# Patient Record
Sex: MALE | Race: OTHER | HISPANIC OR LATINO | URBAN - METROPOLITAN AREA
[De-identification: names, ages, dates, MRNs, and addresses within clinical notes are randomized per-mention and may not be internally consistent; named-entity substitution may affect disease eponyms.]

---

## 2020-07-01 ENCOUNTER — LAB OUTSIDE AN ENCOUNTER (OUTPATIENT)
Dept: URBAN - METROPOLITAN AREA CLINIC 121 | Facility: CLINIC | Age: 40
End: 2020-07-01

## 2020-07-08 LAB
ABSOLUTE BASOPHILS: (no result)
ABSOLUTE EOSINOPHILS: (no result)
ABSOLUTE LYMPHOCYTES: (no result)
ABSOLUTE MONOCYTES: (no result)
ABSOLUTE NEUTROPHILS: (no result)
ALBUMIN/GLOBULIN RATIO: (no result)
ALBUMIN: (no result)
ALKALINE PHOSPHATASE: (no result)
ALT: (no result)
AST: (no result)
BASOPHILS: (no result)
BILIRUBIN, TOTAL: (no result)
BUN/CREATININE RATIO: (no result)
C-REACTIVE PROTEIN: (no result)
CALCIUM: (no result)
CARBON DIOXIDE: (no result)
CHLORIDE: (no result)
CREATININE: (no result)
EGFR AFRICAN AMERICA: (no result)
EGFR NON-AFR. AMERICAN: (no result)
EOSINOPHILS: (no result)
GLOBULIN: (no result)
GLUCOSE: (no result)
HEMATOCRIT: (no result)
HEMOGLOBIN: (no result)
HEPATITIS B CORE ANTIBODY (IGM): (no result)
HEPATITIS B SURFACE ANTIGEN: (no result)
LYMPHOCYTES: (no result)
MCH: (no result)
MCHC: (no result)
MCV: (no result)
MITOGEN-NIL: (no result)
MONOCYTES: (no result)
MPV: (no result)
NEUTROPHILS: (no result)
NIL: (no result)
PLATELET COUNT: (no result)
POTASSIUM: (no result)
PROTEIN, TOTAL: (no result)
QUANTIFERON(R)-TB GOLD PLUS, 1 TUBE: NEGATIVE
RDW: (no result)
RED BLOOD CELL COUNT: (no result)
SODIUM: (no result)
TB1-NIL: (no result)
TB2-NIL: (no result)
UREA NITROGEN (BUN): (no result)
WHITE BLOOD CELL COUNT: (no result)

## 2020-11-11 ENCOUNTER — OFFICE VISIT (OUTPATIENT)
Dept: URBAN - METROPOLITAN AREA CLINIC 63 | Facility: CLINIC | Age: 40
End: 2020-11-11

## 2020-12-01 ENCOUNTER — OFFICE VISIT (OUTPATIENT)
Dept: URBAN - METROPOLITAN AREA CLINIC 121 | Facility: CLINIC | Age: 40
End: 2020-12-01

## 2020-12-10 LAB
ABSOLUTE BASOPHILS: (no result)
ABSOLUTE EOSINOPHILS: (no result)
ABSOLUTE LYMPHOCYTES: (no result)
ABSOLUTE MONOCYTES: (no result)
ABSOLUTE NEUTROPHILS: (no result)
ALBUMIN/GLOBULIN RATIO: (no result)
ALBUMIN: (no result)
ALKALINE PHOSPHATASE: (no result)
ALT: (no result)
AST: (no result)
BASOPHILS: (no result)
BILIRUBIN, TOTAL: (no result)
BUN/CREATININE RATIO: (no result)
CALCIUM: (no result)
CARBON DIOXIDE: (no result)
CHLORIDE: (no result)
COMMENT: (no result)
CREATININE: (no result)
EGFR AFRICAN AMERICA: (no result)
EGFR NON-AFR. AMERICAN: (no result)
EOSINOPHILS: (no result)
GLOBULIN: (no result)
GLUCOSE: (no result)
HEMATOCRIT: (no result)
HEMOGLOBIN: (no result)
INFLIXIMAB AB, IBD: (no result)
INFLIXIMAB LEVEL, IBD: (no result)
INTERPRETATION: (no result)
LYMPHOCYTES: (no result)
MCH: (no result)
MCHC: (no result)
MCV: (no result)
MONOCYTES: (no result)
MPV: (no result)
NEUTROPHILS: (no result)
PLATELET COUNT: (no result)
POTASSIUM: (no result)
PROTEIN, TOTAL: (no result)
RDW: (no result)
RED BLOOD CELL COUNT: (no result)
SODIUM: (no result)
UREA NITROGEN (BUN): (no result)
WHITE BLOOD CELL COUNT: (no result)

## 2021-01-11 ENCOUNTER — OFFICE VISIT (OUTPATIENT)
Dept: URBAN - METROPOLITAN AREA CLINIC 60 | Facility: CLINIC | Age: 41
End: 2021-01-11

## 2021-12-06 ENCOUNTER — LAB OUTSIDE AN ENCOUNTER (OUTPATIENT)
Dept: URBAN - METROPOLITAN AREA CLINIC 121 | Facility: CLINIC | Age: 41
End: 2021-12-06

## 2021-12-22 ENCOUNTER — OFFICE VISIT (OUTPATIENT)
Dept: URBAN - METROPOLITAN AREA CLINIC 121 | Facility: CLINIC | Age: 41
End: 2021-12-22

## 2021-12-30 ENCOUNTER — OFFICE VISIT (OUTPATIENT)
Dept: URBAN - METROPOLITAN AREA CLINIC 60 | Facility: CLINIC | Age: 41
End: 2021-12-30

## 2022-01-10 LAB
ABSOLUTE BASOPHILS: (no result)
ABSOLUTE EOSINOPHILS: (no result)
ABSOLUTE LYMPHOCYTES: (no result)
ABSOLUTE MONOCYTES: (no result)
ABSOLUTE NEUTROPHILS: (no result)
ALBUMIN/GLOBULIN RATIO: (no result)
ALBUMIN: (no result)
ALKALINE PHOSPHATASE: (no result)
ALT: (no result)
AST: (no result)
BASOPHILS: (no result)
BILIRUBIN, TOTAL: (no result)
BUN/CREATININE RATIO: (no result)
CALCIUM: (no result)
CARBON DIOXIDE: (no result)
CHLORIDE: (no result)
COMMENT: (no result)
CREATININE: (no result)
EGFR AFRICAN AMERIC: (no result)
EGFR NON-AFR. AMERI: (no result)
EOSINOPHILS: (no result)
GLOBULIN: (no result)
GLUCOSE: (no result)
HEMATOCRIT: (no result)
HEMOGLOBIN: (no result)
INFLIXIMAB AB, IBD: (no result)
INFLIXIMAB LEVEL, IBD: (no result)
INTERPRETATION: (no result)
LYMPHOCYTES: (no result)
MCH: (no result)
MCHC: (no result)
MCV: (no result)
MONOCYTES: (no result)
MPV: (no result)
NEUTROPHILS: (no result)
PLATELET COUNT: (no result)
POTASSIUM: (no result)
PROTEIN, TOTAL: (no result)
RDW: (no result)
RED BLOOD CELL COUNT: (no result)
SODIUM: (no result)
UREA NITROGEN (BUN): (no result)
WHITE BLOOD CELL COUNT: (no result)

## 2022-01-12 ENCOUNTER — OFFICE VISIT (OUTPATIENT)
Dept: URBAN - METROPOLITAN AREA CLINIC 60 | Facility: CLINIC | Age: 42
End: 2022-01-12

## 2022-02-15 ENCOUNTER — OFFICE VISIT (OUTPATIENT)
Dept: URBAN - METROPOLITAN AREA CLINIC 60 | Facility: CLINIC | Age: 42
End: 2022-02-15

## 2022-04-29 ENCOUNTER — OFFICE VISIT (OUTPATIENT)
Dept: URBAN - METROPOLITAN AREA SURGERY CENTER 4 | Facility: SURGERY CENTER | Age: 42
End: 2022-04-29

## 2022-05-13 ENCOUNTER — OFFICE VISIT (OUTPATIENT)
Dept: URBAN - METROPOLITAN AREA CLINIC 60 | Facility: CLINIC | Age: 42
End: 2022-05-13

## 2022-05-24 ENCOUNTER — OFFICE VISIT (OUTPATIENT)
Dept: URBAN - METROPOLITAN AREA CLINIC 60 | Facility: CLINIC | Age: 42
End: 2022-05-24

## 2022-06-17 ENCOUNTER — OFFICE VISIT (OUTPATIENT)
Dept: URBAN - METROPOLITAN AREA SURGERY CENTER 4 | Facility: SURGERY CENTER | Age: 42
End: 2022-06-17

## 2022-06-24 LAB — PATHOLOGY (INDENTED REPORT): (no result)

## 2022-06-30 ENCOUNTER — OFFICE VISIT (OUTPATIENT)
Dept: URBAN - METROPOLITAN AREA CLINIC 60 | Facility: CLINIC | Age: 42
End: 2022-06-30
Payer: COMMERCIAL

## 2022-06-30 VITALS
OXYGEN SATURATION: 98 % | DIASTOLIC BLOOD PRESSURE: 78 MMHG | BODY MASS INDEX: 32.56 KG/M2 | SYSTOLIC BLOOD PRESSURE: 128 MMHG | WEIGHT: 219.8 LBS | TEMPERATURE: 98.1 F | HEART RATE: 97 BPM | HEIGHT: 69 IN | RESPIRATION RATE: 20 BRPM

## 2022-06-30 DIAGNOSIS — E66.3 OVERWEIGHT: ICD-10-CM

## 2022-06-30 DIAGNOSIS — K63.5 HYPERPLASTIC COLONIC POLYP, UNSPECIFIED PART OF COLON: ICD-10-CM

## 2022-06-30 DIAGNOSIS — K51.00 ULCERATIVE PANCOLITIS WITHOUT COMPLICATION: ICD-10-CM

## 2022-06-30 DIAGNOSIS — K64.1 GRADE II HEMORRHOIDS: ICD-10-CM

## 2022-06-30 PROBLEM — 444548001: Status: ACTIVE | Noted: 2022-06-30

## 2022-06-30 PROCEDURE — 99214 OFFICE O/P EST MOD 30 MIN: CPT | Performed by: NURSE PRACTITIONER

## 2022-06-30 RX ORDER — CALCIUM CARBONATE/VITAMIN D3 600 MG-10
1 CAPSULE TABLET ORAL ONCE A DAY
Status: ACTIVE | COMMUNITY

## 2022-06-30 RX ORDER — INFLIXIMAB 100 MG/10ML
AS DIRECTED INJECTION, POWDER, LYOPHILIZED, FOR SOLUTION INTRAVENOUS
Status: ACTIVE | COMMUNITY

## 2022-06-30 RX ORDER — MESALAMINE 1.2 G/1
4 TAB TABLET, DELAYED RELEASE ORAL DAILY
Status: ACTIVE | COMMUNITY

## 2022-06-30 NOTE — HPI-TODAY'S VISIT:
5/17 Patient comes from [facility] ([place], [place]) with diagnosis of UC in 2016 with chronic diarrhea nd rectal bleeding, had a colonoscopy with evidence of pancolitis without lesion of the ileum,  Patient on prednisone 10 mg after high dose titration, and taking also azulfidine 1 gr  TID. Patient will need Labs and will check CRP and ESR, will check LFT. and CBC. Patient is anemic and on treatment with iron. Will need colonoscopy, and will do IBD panel if is needed. Will try to wean him off prednisone and will continue with same treatment for now but will consider remicade/humira. Will check hepatitis panel, was checked by TB. Will follow closely. discussion [Use for free text]. Discussed the need for appropriate follow-up care.  Patient will be placed in our recall system and a letter will be mailed to the patient.   6/17 Patient with UC, continue with multiples small BM with mucus and blood, no changes with the decreasing of steroids doses. Patient anemic with deficit of folate and Iron, probably as a side effect of use of Azulfidine. labs Increased CPR 4.5 Normal hepatitis panel and liver enzymes.  Plan Will DC Azulfidine.  Patient will start with Apriso. Continue with 2.5 mg of prednisone for now. Start with folic acid 5 mg max dose for now and continue with PO Iron.  Colonoscopy upon clinic status patient may will need treatment with Humira / Remicade.  8/17 Patient is feeling better, BM frequency are decreased, no hematochezia or mucus. Denies abdominal pain. Patient will continue treatment with folate and Iron, and with Lialda and will have colonoscopy. will do labs for folate and Iron next visit.  9/17 Patient is feeling better, gaining weight, more energy and general state, no BM during the night, 3-4 BM at day with no blood or mucus. Patient will do Lialda maintenance dose of 2.4 g at day. Colonoscopy with evidence of acute on chronic colon mucosa inflammation, , friable, vascular,pseudopolyps aspect of the colon mucosa, with normal mucosa of the terminal ileum  Patient will do Lialda maintenance dose of 2.4 g at day. will do labs in next visit.  1/19 Patient here today in good general state, he said recently has a flare of his UC, because he had not insurance or not medication. he had resume his treatment this month and is doing better but still more than 6 time in 24 hrs, no blood, or mucus, not abdominal pain. Also complaint of symptoms of gastritis. will plan colonoscopy and EGD upon clinical state and labs results. Patient may need treatment with biologic. Plan Patient will increase Lialda to 4.8 mg at day will start PPI. 3/19 Patient here today in good general state, he said has not more diarrhea or rectal bleeding regular bleeding. Stool studies are negative, anemia 11.1. CRP and ESR are normal. Patient will continue with Lialda, we will consider colonoscopy in 5 month. will start treatment with Iron. 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  Patient said has been doing better this lasts couple of month, he had just 1 event of rectal bleeding a month ago, he said is feeling better has more energy and no anemia.  Patient had a colonoscopy done 2 years ago. Plan: Patient will have laboratories to check renal function, H&H, and we have surveillance colonoscopy. Continue with Lialda.  9/19 Patient here today in good general state, patient denies any GI symptoms, no rectal bleeding, no abdominal pain, no mucus discharge.  Laboratories he is here for his colonoscopy results he was found having 4 mm adenoma polyps into the transverse colon, internal hemorrhoids active vascular, congested, erythematous, friable, pseudopolyps mucosa in the entire examined colon.  Patient with medical history of ulcerative colitis.  Patient hemoglobin improved to 14.7.  Normal platelets, normal WBC, and RBC, normal liver enzymes, normal renal function, and blood sugar.  Levels of B12 and folate are normal. Plan: We will refer him with Dr.Jennifer Wise to have a chrome endoscopy to rule out cancer or pre-cancer lesions.  Patient will continue with Lialda 1.2 gm 2 tablets twice a day. 11/19 Patient here today in good general state he denies any abdominal pain, diarrhea, rectal bleeding.  Patient has an appointment in [place] to be seen by Dr. Odell due to colonoscopy positive for adenoma in the seating of a Ulcerative Colitis, since Dr. Dalia Alicea is not doing this procedure in [place] anymore. Patient will continue treatment with Lialda. 2/20 Patient here today in good general state, he said is a symptomatic, has normal bowel movements, no rectal bleeding, no mucus discharge, no abdominal pain.  Patient did visited the [facility] specialist, he was recommended to start on Remicade, by patient report he going to start with Remicade on a month.  We will obtain records from [place].  Patient did not have the special colonoscopy done yet, he was advised to have several treatments of Remicade before he had a repeated colonoscopy. 4/20 Patient seems to be in good general state today, patient has medical history of severe ulcerative colitis with adenoma polyps of the colon.  He had no GI complaints, no abdominal pain, diarrhea, rectal bleeding, mucus discharge, fever, cough, sore throat.  He said he did not started with Remicade treatment because of the Covid 19 pandemia, he is afraid that his immune system get affected in the grade that he can acquire the virus.  Patient is taking Lialda at this time, doing diet, and is in self quarantine at home. Patient will continue in self isolation for now and will continue with Lialda until start with Remicade. 11/20 Patient here today in good general state, he said is feeling well, he completed his fourth doses of Remicade.  He said still using the Lialda 1.2 g bid.  Because when he stopped Lialda he started having diarrhea again.  But he also said he noticed the dual treatment with Remicade and Lialda work good for him. Plan: Patient will continue with this treatment.  And patient will have laboratories below to check on Remicade serum levels and drug antibody. Patient will need to visit his Dr in [place] in regard of his colonoscopy, since he had 4 dose of Remicade already to decide if he should continue with this treatment based on his pathologic and laboratory results or not. 1/21 Patient here today in good general state, patient laboratories shows evidence of: CBC with normal hemoglobin, WBC, and RBC,  CMP: Liver function, bilirubin, proteins, kidney function, and glucose are normal.  Remicade levels are normal, Remicade antibodies slightly elevated 22. Patient here today in good general state, he has not GI complaints today.  He said have no abdominal pain, diarrhea, rectal bleeding or any other GI symptoms.  Patient did not visit Dr. Odell in [facility] yet for his colonoscopy, he said he is planning to see her next month. Patient will continue with treatment with Lialda and Remicade, we will check antibodies and level of the Remicade in 4 months. 1/22 Patient here today in good general state.  He had no GI complaints.  Denies any diarrhea, rectal discharge, abdominal pain, or rectal bleeding.  Laboratories CBC was normal CMP shows evidence of elevated creatinine and low glomerular filtration rate.  Patient liver enzymes are normal.  Remicade antibodies are elevated more than 100 medication level is 4.0. Plan: We will repeat laboratory to check on liver function.  And also will repeat laboratories for increasing mid back level and antibody on [facility] laboratories.   Patient is a symptomatic but, we may increase the frequency of the Remicade infusions upon patient laboratories to decrease the chance of flares. Patient has medical history of large tubular adenoma polyps 2 years ago we will plan colonoscopy in his next office visit.  2/22 Patient here today in good general state.  He denies any GI symptoms, abdominal pain, diarrhea, rectal bleeding or any sign of ulcerative colitis flare. Laboratories done at [facility] shows evidence of none detectable serum Infliximab less than 1.0 and detectable antibodies to infliximab 5.0. 6/22 Colonoscopy report demonstrate two 12 to 15 mm polyps into the mid descending colon, removed.  A diminutive polyp into the mid ascending colon, removed.  Pseudopolyps in the entire examined colon.  Inflamed mucosa in the rectum, in the descending colon, at the splenic flexure, in the transverse colon, at the hepatic flexure, in the ascending and in the cecum/ biopsied.  The examined portion of the ileum was normal.  No bleeding internal hemorrhoids. Biopsy results shows evidence of terminal ileum biopsy with a small bowel mucosa with mild chronic enteritis.  Right colon biopsy chronic colitis.  Ascending colon polypectomy: Inflammatory pseudopolyp.  Left colon biopsy: Chronic active colitis with isolated cryptitis.  Mid descending colon polypectomy: Chronic active colitis with ulcerations.  Sigmoid colon biopsy: Chronic colitis with distortion, no cryptitis.  Rectum biopsy: Chronic active colitis.  Today patient is in good general state he has no GI complaints he denies any rectal bleeding, rectal discharge, diarrhea, constipation, abdominal pain or any other GI symptoms. Laboratories done on June 14, 2022 demonstrate lipid panel with slightly elevated triglyceride 212, LDL cholesterol 105, non-HDL cholesterol 138.  CMP glomerular filtration rate 80, creatinine 1.15 normal, normal liver enzymes and blood sugar.  CBC normal, TSH normal.

## 2022-06-30 NOTE — PHYSICAL EXAM HENT:
Head,  normocephalic,  atraumatic,  Face,  Face within normal limits,  Ears,  External ears within normal limits,  Nose/Nasopharynx,  External nose  normal appearance,  no nasal discharge,  Mouth and Throat,  Oral cavity appearance normal. Mucosa normal. Lips,  Appearance normal

## 2022-06-30 NOTE — PHYSICAL EXAM CHEST:
No lesions,  no deformities, chest wall non-tender,  no masses present, breathing is unlabored without accessory muscle use, normal breath sounds

## 2022-06-30 NOTE — PHYSICAL EXAM GASTROINTESTINAL
Abdomen: Soft, nontender, nondistended , no guarding or rigidity , no masses palpable , normal bowel sounds , Liver and Spleen , no hepatomegaly present , no hepatosplenomegaly , liver nontender , spleen not palpable

## 2022-06-30 NOTE — PHYSICAL EXAM NECK/THYROID:
Normal appearance , without tenderness upon palpation , no deformities , trachea midline ,no masses , thyroid nontender.

## 2022-07-09 ENCOUNTER — TELEPHONE ENCOUNTER (OUTPATIENT)
Dept: URBAN - METROPOLITAN AREA CLINIC 121 | Facility: CLINIC | Age: 42
End: 2022-07-09

## 2022-07-09 RX ORDER — MESALAMINE 1.2 G/1
4 TAB DAILY TABLET, DELAYED RELEASE ORAL
Refills: 0 | OUTPATIENT
Start: 2019-03-22 | End: 2019-03-22

## 2022-07-09 RX ORDER — PREDNISONE 10 MG/1
TABLET ORAL
Refills: 0 | OUTPATIENT
Start: 2017-05-02 | End: 2017-06-02

## 2022-07-09 RX ORDER — PREDNISONE 5 MG/1
EVERY OTHER DAY TABLET ORAL
Refills: 0 | OUTPATIENT
Start: 2017-06-02 | End: 2017-08-07

## 2022-07-09 RX ORDER — FOLIC ACID 5 MG
CAPSULE ORAL
Refills: 0 | OUTPATIENT
Start: 2017-08-07 | End: 2017-09-05

## 2022-07-09 RX ORDER — MESALAMINE 1.2 G/1
TABLET, DELAYED RELEASE ORAL
Refills: 3 | OUTPATIENT
Start: 2017-06-30 | End: 2017-06-30

## 2022-07-09 RX ORDER — SUCRALFATE 1 G/1
TABLET ORAL TWICE A DAY
Refills: 0 | OUTPATIENT
Start: 2019-03-22 | End: 2019-07-22

## 2022-07-09 RX ORDER — MESALAMINE 1.2 G/1
2 TWICE A DAY TABLET, DELAYED RELEASE ORAL
Refills: 0 | OUTPATIENT
Start: 2019-01-22 | End: 2019-02-21

## 2022-07-09 RX ORDER — SULFASALAZINE 500 MG/1
2 TAB EVERY 8 HOURS TABLET ORAL
Refills: 0 | OUTPATIENT
Start: 2017-06-02 | End: 2017-08-07

## 2022-07-09 RX ORDER — FOLIC ACID 5 MG
CAPSULE ORAL
Refills: 0 | OUTPATIENT
Start: 2019-11-15 | End: 2020-02-06

## 2022-07-09 RX ORDER — MESALAMINE 375 MG/1
4 ONCE A DAY CAPSULE, EXTENDED RELEASE ORAL
Refills: 3 | OUTPATIENT
Start: 2017-06-02 | End: 2017-08-07

## 2022-07-09 RX ORDER — MESALAMINE 375 MG/1
CAPSULE, EXTENDED RELEASE ORAL ONCE A DAY
Refills: 0 | OUTPATIENT
Start: 2020-02-06 | End: 2020-04-10

## 2022-07-09 RX ORDER — MESALAMINE 1.2 G/1
4 ONCE A DAY TABLET, DELAYED RELEASE ORAL
Refills: 0 | OUTPATIENT
Start: 2022-01-12 | End: 2022-01-12

## 2022-07-09 RX ORDER — PREDNISONE 5 MG/1
EVERY OTHER DAY TABLET ORAL
Refills: 0 | OUTPATIENT
Start: 2017-08-07 | End: 2017-09-05

## 2022-07-09 RX ORDER — FOLIC ACID 5 MG
CAPSULE ORAL
Refills: 0 | OUTPATIENT
Start: 2019-07-22 | End: 2019-09-18

## 2022-07-09 RX ORDER — MESALAMINE 1.2 G/1
4 ONCE A DAY TABLET, DELAYED RELEASE ORAL
Refills: 0 | OUTPATIENT
Start: 2021-10-21 | End: 2022-01-12

## 2022-07-09 RX ORDER — MESALAMINE 1.2 G/1
4 ONCE A DAY TABLET, DELAYED RELEASE ORAL
Refills: 3 | OUTPATIENT
Start: 2019-01-04 | End: 2019-01-22

## 2022-07-09 RX ORDER — MESALAMINE 1.2 G/1
4 ONCE A DAY TABLET, DELAYED RELEASE ORAL
Refills: 4 | OUTPATIENT
Start: 2020-02-12 | End: 2020-06-30

## 2022-07-09 RX ORDER — MESALAMINE 1.2 G/1
TABLET, DELAYED RELEASE ORAL
Refills: 3 | OUTPATIENT
Start: 2017-06-30 | End: 2017-08-07

## 2022-07-09 RX ORDER — ASCORBIC ACID/MULTIVIT-MIN 1000 MG
EFFERVESCENT POWDER IN PACKET ORAL
Refills: 0 | OUTPATIENT
Start: 2019-11-15 | End: 2020-02-06

## 2022-07-09 RX ORDER — MESALAMINE 1.2 G/1
4 ONCE A DAY TABLET, DELAYED RELEASE ORAL
Refills: 4 | OUTPATIENT
Start: 2019-03-22 | End: 2019-07-22

## 2022-07-09 RX ORDER — FOLIC ACID 5 MG
CAPSULE ORAL
Refills: 0 | OUTPATIENT
Start: 2017-09-05 | End: 2019-01-22

## 2022-07-09 RX ORDER — FOLIC ACID 5 MG
CAPSULE ORAL
Refills: 0 | OUTPATIENT
Start: 2019-03-22 | End: 2019-07-22

## 2022-07-09 RX ORDER — MESALAMINE 1.2 G/1
2 TWICE A DAY TABLET, DELAYED RELEASE ORAL
Refills: 6 | OUTPATIENT
Start: 2019-07-22 | End: 2020-02-06

## 2022-07-09 RX ORDER — MESALAMINE 1.2 G/1
4 TAB DAILY TABLET, DELAYED RELEASE ORAL
Refills: 0 | OUTPATIENT
Start: 2017-09-05 | End: 2019-01-22

## 2022-07-09 RX ORDER — MESALAMINE 1.2 G/1
4 ONCE A DAY TABLET, DELAYED RELEASE ORAL
Refills: 4 | OUTPATIENT
Start: 2021-05-05 | End: 2021-10-21

## 2022-07-09 RX ORDER — MESALAMINE 1.2 G/1
4 ONCE A DAY TABLET, DELAYED RELEASE ORAL
Refills: 0 | OUTPATIENT
Start: 2022-01-17 | End: 2022-04-19

## 2022-07-09 RX ORDER — MESALAMINE 1.2 G/1
2 TWICE A DAY TABLET, DELAYED RELEASE ORAL
Refills: 3 | OUTPATIENT
Start: 2019-02-21 | End: 2019-03-22

## 2022-07-09 RX ORDER — SUCRALFATE 1 G/1
TWICE A DAY TABLET ORAL TWICE A DAY
Refills: 0 | OUTPATIENT
Start: 2019-01-22 | End: 2019-02-21

## 2022-07-09 RX ORDER — CHLORHEXIDINE GLUCONATE 4 %
LIQUID (ML) TOPICAL
Refills: 0 | OUTPATIENT
Start: 2017-08-07 | End: 2017-09-05

## 2022-07-09 RX ORDER — CHLORHEXIDINE GLUCONATE 4 %
LIQUID (ML) TOPICAL
Refills: 0 | OUTPATIENT
Start: 2017-09-05 | End: 2019-01-22

## 2022-07-09 RX ORDER — MESALAMINE 1.2 G/1
2 CAP BID TABLET, DELAYED RELEASE ORAL
Refills: 0 | OUTPATIENT
Start: 2019-07-22 | End: 2019-07-22

## 2022-07-09 RX ORDER — SULFASALAZINE 500 MG/1
2 TAB EVERY 8 HOURS TABLET ORAL
Refills: 0 | OUTPATIENT
Start: 2017-05-02 | End: 2017-06-02

## 2022-07-09 RX ORDER — MESALAMINE 1.2 G/1
TABLET, DELAYED RELEASE ORAL ONCE A DAY
Refills: 0 | OUTPATIENT
Start: 2020-02-06 | End: 2020-11-11

## 2022-07-09 RX ORDER — MESALAMINE 1.2 G/1
4 TAB DAILY TABLET, DELAYED RELEASE ORAL
Refills: 0 | OUTPATIENT
Start: 2017-08-07 | End: 2017-09-05

## 2022-07-09 RX ORDER — FERROUS SULFATE 220 (44)/5
2 TAB DAILY ELIXIR ORAL
Refills: 0 | OUTPATIENT
Start: 2017-05-02 | End: 2017-06-02

## 2022-07-09 RX ORDER — FERROUS SULFATE 220 (44)/5
2 TAB DAILY ELIXIR ORAL
Refills: 0 | OUTPATIENT
Start: 2017-06-02 | End: 2017-08-07

## 2022-07-09 RX ORDER — MESALAMINE 1.2 G/1
4 ONCE A DAY TABLET, DELAYED RELEASE ORAL
Refills: 4 | OUTPATIENT
Start: 2020-06-30 | End: 2020-11-11

## 2022-07-09 RX ORDER — FOLIC ACID 5 MG
CAPSULE ORAL
Refills: 0 | OUTPATIENT
Start: 2019-01-22 | End: 2019-03-22

## 2022-07-09 RX ORDER — MESALAMINE 1.2 G/1
4 ONCE A DAY TABLET, DELAYED RELEASE ORAL
Refills: 4 | OUTPATIENT
Start: 2017-09-05 | End: 2020-02-12

## 2022-07-09 RX ORDER — MESALAMINE 1.2 G/1
ONCE A DAY TABLET, DELAYED RELEASE ORAL ONCE A DAY
Refills: 4 | OUTPATIENT
Start: 2019-01-03 | End: 2019-01-22

## 2022-07-09 RX ORDER — SUCRALFATE 1 G/1
TWICE A DAY TABLET ORAL TWICE A DAY
Refills: 1 | OUTPATIENT
Start: 2019-02-21 | End: 2019-03-22

## 2022-07-09 RX ORDER — PREDNISONE 5 MG/1
TABLET ORAL ONCE A DAY
Refills: 0 | OUTPATIENT
Start: 2020-02-06 | End: 2020-11-11

## 2022-07-09 RX ORDER — MESALAMINE 1.2 G/1
4 ONCE A DAY TABLET, DELAYED RELEASE ORAL
Refills: 4 | OUTPATIENT
Start: 2020-11-12 | End: 2021-05-05

## 2022-07-09 RX ORDER — FOLIC ACID 5 MG
CAPSULE ORAL
Refills: 0 | OUTPATIENT
Start: 2019-09-18 | End: 2019-11-15

## 2022-07-09 RX ORDER — PREDNISONE 5 MG/1
EVERY OTHER DAY TABLET ORAL
Refills: 0 | OUTPATIENT
Start: 2017-09-05 | End: 2019-01-22

## 2022-07-09 RX ORDER — MESALAMINE 1.2 G/1
4 TAB DAILY TABLET, DELAYED RELEASE ORAL
Refills: 0 | OUTPATIENT
Start: 2019-01-22 | End: 2019-03-22

## 2022-07-10 ENCOUNTER — TELEPHONE ENCOUNTER (OUTPATIENT)
Dept: URBAN - METROPOLITAN AREA CLINIC 121 | Facility: CLINIC | Age: 42
End: 2022-07-10

## 2022-07-10 RX ORDER — MESALAMINE 375 MG/1
4 ONCE A DAY CAPSULE, EXTENDED RELEASE ORAL
Refills: 0 | Status: ACTIVE | COMMUNITY
Start: 2017-06-14

## 2022-07-10 RX ORDER — MESALAMINE 1.2 G/1
2 ONCE A DAY TABLET, DELAYED RELEASE ORAL
Refills: 4 | Status: ACTIVE | COMMUNITY
Start: 2017-09-05

## 2022-07-10 RX ORDER — MESALAMINE 1.2 G/1
4 ONCE A DAY TABLET, DELAYED RELEASE ORAL
Refills: 0 | Status: ACTIVE | COMMUNITY
Start: 2022-04-19

## 2022-07-10 RX ORDER — OMEPRAZOLE 20 MG/1
TWICE A DAY CAPSULE, DELAYED RELEASE ORAL TWICE A DAY
Refills: 0 | Status: ACTIVE | COMMUNITY
Start: 2019-01-22

## 2022-07-10 RX ORDER — FOLIC ACID 5 MG
ONCE A DAY CAPSULE ORAL ONCE A DAY
Refills: 0 | Status: ACTIVE | COMMUNITY
Start: 2017-06-02

## 2022-07-25 ENCOUNTER — TELEPHONE ENCOUNTER (OUTPATIENT)
Dept: URBAN - METROPOLITAN AREA CLINIC 63 | Facility: CLINIC | Age: 42
End: 2022-07-25

## 2022-07-25 RX ORDER — MESALAMINE 1.2 G/1
4 TAB TABLET, DELAYED RELEASE ORAL ONCE A DAY
Qty: 360 TABLET | Refills: 0

## 2022-07-29 ENCOUNTER — LAB OUTSIDE AN ENCOUNTER (OUTPATIENT)
Dept: URBAN - METROPOLITAN AREA CLINIC 60 | Facility: CLINIC | Age: 42
End: 2022-07-29

## 2022-08-10 ENCOUNTER — ERX REFILL RESPONSE (OUTPATIENT)
Dept: URBAN - METROPOLITAN AREA CLINIC 63 | Facility: CLINIC | Age: 42
End: 2022-08-10

## 2022-08-10 RX ORDER — MESALAMINE 1.2 G/1
TAKE 4 TABLETS BY MOUTH EVERY DAY TABLET, DELAYED RELEASE ORAL
Qty: 360 TABLET | Refills: 0 | OUTPATIENT

## 2022-08-10 RX ORDER — MESALAMINE 1.2 G/1
4 ONCE A DAY TABLET, DELAYED RELEASE ORAL
Refills: 0 | OUTPATIENT

## 2022-08-11 ENCOUNTER — WEB ENCOUNTER (OUTPATIENT)
Dept: URBAN - METROPOLITAN AREA CLINIC 60 | Facility: CLINIC | Age: 42
End: 2022-08-11

## 2022-08-11 ENCOUNTER — OFFICE VISIT (OUTPATIENT)
Dept: URBAN - METROPOLITAN AREA CLINIC 60 | Facility: CLINIC | Age: 42
End: 2022-08-11
Payer: COMMERCIAL

## 2022-08-11 VITALS
HEART RATE: 97 BPM | WEIGHT: 224 LBS | SYSTOLIC BLOOD PRESSURE: 122 MMHG | DIASTOLIC BLOOD PRESSURE: 76 MMHG | HEIGHT: 69 IN | TEMPERATURE: 97.7 F | RESPIRATION RATE: 20 BRPM | OXYGEN SATURATION: 98 % | BODY MASS INDEX: 33.18 KG/M2

## 2022-08-11 DIAGNOSIS — K51.018 ULCERATIVE PANCOLITIS WITH OTHER COMPLICATION: ICD-10-CM

## 2022-08-11 PROCEDURE — 99214 OFFICE O/P EST MOD 30 MIN: CPT | Performed by: NURSE PRACTITIONER

## 2022-08-11 RX ORDER — OMEPRAZOLE 20 MG/1
TWICE A DAY CAPSULE, DELAYED RELEASE ORAL TWICE A DAY
Refills: 0 | Status: ACTIVE | COMMUNITY
Start: 2019-01-22

## 2022-08-11 RX ORDER — MESALAMINE 1.2 G/1
4 ONCE A DAY TABLET, DELAYED RELEASE ORAL
Refills: 0 | Status: ACTIVE | COMMUNITY
Start: 2022-04-19

## 2022-08-11 RX ORDER — INFLIXIMAB 100 MG/10ML
AS DIRECTED INJECTION, POWDER, LYOPHILIZED, FOR SOLUTION INTRAVENOUS
Status: ACTIVE | COMMUNITY

## 2022-08-11 RX ORDER — CALCIUM CARBONATE/VITAMIN D3 600 MG-10
1 CAPSULE TABLET ORAL ONCE A DAY
Status: ACTIVE | COMMUNITY

## 2022-08-11 RX ORDER — UPADACITINIB 45 MG/1
AS DIRECTED TABLET, EXTENDED RELEASE ORAL
Qty: 60 | Refills: 0 | OUTPATIENT
Start: 2022-08-12 | End: 2022-10-11

## 2022-08-11 NOTE — HPI-TODAY'S VISIT:
5/17 Patient comes from [facility] with diagnosis of UC in 2016 with chronic diarrhea nd rectal bleeding, had a colonoscopy with evidence of pancolitis without lesion of the ileum,  Patient on prednisone 10 mg after high dose titration, and taking also azulfidine 1 gr  TID. Patient will need Labs and will check CRP and ESR, will check LFT. and CBC. Patient is anemic and on treatment with iron. Will need colonoscopy, and will do IBD panel if is needed. Will try to wean him off prednisone and will continue with same treatment for now but will consider remicade/humira. Will check hepatitis panel, was checked by TB. Will follow closely. discussion [Use for free text]. Discussed the need for appropriate follow-up care.  Patient will be placed in our recall system and a letter will be mailed to the patient.   6/17 Patient with UC, continue with multiples small BM with mucus and blood, no changes with the decreasing of steroids doses. Patient anemic with deficit of folate and Iron, probably as a side effect of use of Azulfidine. labs Increased CPR 4.5 Normal hepatitis panel and liver enzymes.  Plan Will DC Azulfidine.  Patient will start with Apriso. Continue with 2.5 mg of prednisone for now. Start with folic acid 5 mg max dose for now and continue with PO Iron.  Colonoscopy upon clinic status patient may will need treatment with Humira / Remicade.  8/17 Patient is feeling better, BM frequency are decreased, no hematochezia or mucus. Denies abdominal pain. Patient will continue treatment with folate and Iron, and with Lialda and will have colonoscopy. will do labs for folate and Iron next visit.  9/17 Patient is feeling better, gaining weight, more energy and general state, no BM during the night, 3-4 BM at day with no blood or mucus. Patient will do Lialda maintenance dose of 2.4 g at day. Colonoscopy with evidence of acute on chronic colon mucosa inflammation, , friable, vascular,pseudopolyps aspect of the colon mucosa, with normal mucosa of the terminal ileum  Patient will do Lialda maintenance dose of 2.4 g at day. will do labs in next visit.  1/19 Patient here today in good general state, he said recently has a flare of his UC, because he had not insurance or not medication. he had resume his treatment this month and is doing better but still more than 6 time in 24 hrs, no blood, or mucus, not abdominal pain. Also complaint of symptoms of gastritis. will plan colonoscopy and EGD upon clinical state and labs results. Patient may need treatment with biologic. Plan Patient will increase Lialda to 4.8 mg at day will start PPI. 3/19 Patient here today in good general state, he said has not more diarrhea or rectal bleeding regular bleeding. Stool studies are negative, anemia 11.1. CRP and ESR are normal. Patient will continue with Lialda, we will consider colonoscopy in 5 month. will start treatment with Iron. 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  Patient said has been doing better this lasts couple of month, he had just 1 event of rectal bleeding a month ago, he said is feeling better has more energy and no anemia.  Patient had a colonoscopy done 2 years ago. Plan: Patient will have laboratories to check renal function, H&H, and we have surveillance colonoscopy. Continue with Lialda.  9/19 Patient here today in good general state, patient denies any GI symptoms, no rectal bleeding, no abdominal pain, no mucus discharge.  Laboratories he is here for his colonoscopy results he was found having 4 mm adenoma polyps into the transverse colon, internal hemorrhoids active vascular, congested, erythematous, friable, pseudopolyps mucosa in the entire examined colon.  Patient with medical history of ulcerative colitis.  Patient hemoglobin improved to 14.7.  Normal platelets, normal WBC, and RBC, normal liver enzymes, normal renal function, and blood sugar.  Levels of B12 and folate are normal. Plan: We will refer him with Dr.Jennifer Wise to have a chrome endoscopy to rule out cancer or pre-cancer lesions.  Patient will continue with Lialda 1.2 gm 2 tablets twice a day. 11/19 Patient here today in good general state he denies any abdominal pain, diarrhea, rectal bleeding.  Patient has an appointment in [place] to be seen by Dr. Odell due to colonoscopy positive for adenoma in the seating of a Ulcerative Colitis, since Dr. Dalia Alicea is not doing this procedure in [place] anymore. Patient will continue treatment with Lialda. 2/20 Patient here today in good general state, he said is a symptomatic, has normal bowel movements, no rectal bleeding, no mucus discharge, no abdominal pain.  Patient did visited the [facility] specialist, he was recommended to start on Remicade, by patient report he going to start with Remicade on a month.  We will obtain records from [place].  Patient did not have the special colonoscopy done yet, he was advised to have several treatments of Remicade before he had a repeated colonoscopy. 4/20 Patient seems to be in good general state today, patient has medical history of severe ulcerative colitis with adenoma polyps of the colon.  He had no GI complaints, no abdominal pain, diarrhea, rectal bleeding, mucus discharge, fever, cough, sore throat.  He said he did not started with Remicade treatment because of the Covid 19 pandemia, he is afraid that his immune system get affected in the grade that he can acquire the virus.  Patient is taking Lialda at this time, doing diet, and is in self quarantine at home. Patient will continue in self isolation for now and will continue with Lialda until start with Remicade. 11/20 Patient here today in good general state, he said is feeling well, he completed his fourth doses of Remicade.  He said still using the Lialda 1.2 g bid.  Because when he stopped Lialda he started having diarrhea again.  But he also said he noticed the dual treatment with Remicade and Lialda work good for him. Plan: Patient will continue with this treatment.  And patient will have laboratories below to check on Remicade serum levels and drug antibody. Patient will need to visit his Dr in [place] in regard of his colonoscopy, since he had 4 dose of Remicade already to decide if he should continue with this treatment based on his pathologic and laboratory results or not. 1/21 Patient here today in good general state, patient laboratories shows evidence of: CBC with normal hemoglobin, WBC, and RBC,  CMP: Liver function, bilirubin, proteins, kidney function, and glucose are normal.  Remicade levels are normal, Remicade antibodies slightly elevated 22. Patient here today in good general state, he has not GI complaints today.  He said have no abdominal pain, diarrhea, rectal bleeding or any other GI symptoms.  Patient did not visit Dr. Odell in [facility] yet for his colonoscopy, he said he is planning to see her next month. Patient will continue with treatment with Lialda and Remicade, we will check antibodies and level of the Remicade in 4 months. 1/22 Patient here today in good general state.  He had no GI complaints.  Denies any diarrhea, rectal discharge, abdominal pain, or rectal bleeding.  Laboratories CBC was normal CMP shows evidence of elevated creatinine and low glomerular filtration rate.  Patient liver enzymes are normal.  Remicade antibodies are elevated more than 100 medication level is 4.0. Plan: We will repeat laboratory to check on liver function.  And also will repeat laboratories for increasing mid back level and antibody on [place] laboratories.   Patient is a symptomatic but, we may increase the frequency of the Remicade infusions upon patient laboratories to decrease the chance of flares. Patient has medical history of large tubular adenoma polyps 2 years ago we will plan colonoscopy in his next office visit.  2/22 Patient here today in good general state.  He denies any GI symptoms, abdominal pain, diarrhea, rectal bleeding or any sign of ulcerative colitis flare. Laboratories done at [place] shows evidence of none detectable serum Infliximab less than 1.0 and detectable antibodies to infliximab 5.0. 6/22 Colonoscopy report demonstrate two 12 to 15 mm polyps into the mid descending colon, removed.  A diminutive polyp into the mid ascending colon, removed.  Pseudopolyps in the entire examined colon.  Inflamed mucosa in the rectum, in the descending colon, at the splenic flexure, in the transverse colon, at the hepatic flexure, in the ascending and in the cecum/ biopsied.  The examined portion of the ileum was normal.  No bleeding internal hemorrhoids. Biopsy results shows evidence of terminal ileum biopsy with a small bowel mucosa with mild chronic enteritis.  Right colon biopsy chronic colitis.  Ascending colon polypectomy: Inflammatory pseudopolyp.  Left colon biopsy: Chronic active colitis with isolated cryptitis.  Mid descending colon polypectomy: Chronic active colitis with ulcerations.  Sigmoid colon biopsy: Chronic colitis with distortion, no cryptitis.  Rectum biopsy: Chronic active colitis.  Today patient is in good general state he has no GI complaints he denies any rectal bleeding, rectal discharge, diarrhea, constipation, abdominal pain or any other GI symptoms. Laboratories done on June 14, 2022 demonstrate lipid panel with slightly elevated triglyceride 212, LDL cholesterol 105, non-HDL cholesterol 138.  CMP glomerular filtration rate 80, creatinine 1.15 normal, normal liver enzymes and blood sugar.  CBC normal, TSH normal. 8/22  Patient here today in good general state. He denies any rectal bleeding, abdominal pain, diarrhea, or constipation, bowel movements around 3-4 a day. Some mucus discharge on and off. Patient states he is feeling fairly well. we have a lengthy conversation with him about his colon progressing ulcerative colitis condition demonstrated by her last colonoscopy/ biosy results, and possible future complications as severe flares, cancer, colon resection.  Patient has been in Remicade and mesalamine through the last 2-year, with no improvement of the colonic mucosa, with findings of tubular adenoma polyps, pseudopolyps, and extensive ulcerative process throughout the colon. Patient laboratories showed adequate levels of Infliximab the day before of the Remicade infusion and 3 weeks after the infusion with no antibodies present, despite of these laboratory results and based his colonoscopy/pathology results we think these treatment is not working for him.  So, we think it is better for this patient to stop Remicade infusion and mesalamine and start treatment with RinvoQ 45 mg daily for 8 weeks and upon patient clinical course will decrease to 35 mg daily maintenance dose.

## 2022-08-25 ENCOUNTER — TELEPHONE ENCOUNTER (OUTPATIENT)
Dept: URBAN - METROPOLITAN AREA CLINIC 60 | Facility: CLINIC | Age: 42
End: 2022-08-25

## 2022-09-02 ENCOUNTER — TELEPHONE ENCOUNTER (OUTPATIENT)
Dept: URBAN - METROPOLITAN AREA CLINIC 63 | Facility: CLINIC | Age: 42
End: 2022-09-02

## 2022-09-23 ENCOUNTER — WEB ENCOUNTER (OUTPATIENT)
Dept: URBAN - METROPOLITAN AREA CLINIC 60 | Facility: CLINIC | Age: 42
End: 2022-09-23

## 2022-09-23 ENCOUNTER — OFFICE VISIT (OUTPATIENT)
Dept: URBAN - METROPOLITAN AREA CLINIC 60 | Facility: CLINIC | Age: 42
End: 2022-09-23
Payer: COMMERCIAL

## 2022-09-23 VITALS
HEIGHT: 69 IN | TEMPERATURE: 98.6 F | BODY MASS INDEX: 33.33 KG/M2 | HEART RATE: 98 BPM | SYSTOLIC BLOOD PRESSURE: 120 MMHG | WEIGHT: 225 LBS | OXYGEN SATURATION: 98 % | DIASTOLIC BLOOD PRESSURE: 80 MMHG

## 2022-09-23 DIAGNOSIS — K51.018 ULCERATIVE PANCOLITIS WITH OTHER COMPLICATION: ICD-10-CM

## 2022-09-23 DIAGNOSIS — D12.6 BENIGN NEOPLASM OF COLON, UNSPECIFIED: ICD-10-CM

## 2022-09-23 DIAGNOSIS — K64.8 OTHER HEMORRHOIDS: ICD-10-CM

## 2022-09-23 PROCEDURE — 99213 OFFICE O/P EST LOW 20 MIN: CPT | Performed by: NURSE PRACTITIONER

## 2022-09-23 RX ORDER — CALCIUM CARBONATE/VITAMIN D3 600 MG-10
1 CAPSULE TABLET ORAL ONCE A DAY
Status: ACTIVE | COMMUNITY

## 2022-09-23 RX ORDER — UPADACITINIB 45 MG/1
AS DIRECTED TABLET, EXTENDED RELEASE ORAL
Qty: 60 | Refills: 0 | OUTPATIENT

## 2022-09-23 RX ORDER — UPADACITINIB 45 MG/1
AS DIRECTED TABLET, EXTENDED RELEASE ORAL
Qty: 60 | Refills: 0 | Status: ACTIVE | COMMUNITY
Start: 2022-08-12 | End: 2022-10-11

## 2022-11-18 ENCOUNTER — TELEPHONE ENCOUNTER (OUTPATIENT)
Dept: URBAN - METROPOLITAN AREA CLINIC 60 | Facility: CLINIC | Age: 42
End: 2022-11-18

## 2022-11-23 ENCOUNTER — OFFICE VISIT (OUTPATIENT)
Dept: URBAN - METROPOLITAN AREA CLINIC 60 | Facility: CLINIC | Age: 42
End: 2022-11-23

## 2022-11-23 ENCOUNTER — LAB OUTSIDE AN ENCOUNTER (OUTPATIENT)
Dept: URBAN - METROPOLITAN AREA CLINIC 63 | Facility: CLINIC | Age: 42
End: 2022-11-23

## 2022-11-23 RX ORDER — CALCIUM CARBONATE/VITAMIN D3 600 MG-10
1 CAPSULE TABLET ORAL ONCE A DAY
Status: ACTIVE | COMMUNITY

## 2022-11-23 RX ORDER — UPADACITINIB 45 MG/1
AS DIRECTED TABLET, EXTENDED RELEASE ORAL
Qty: 60 | Refills: 0 | Status: ACTIVE | COMMUNITY

## 2022-11-25 LAB
A/G RATIO: 1.6
ABSOLUTE BASOPHILS: 17
ABSOLUTE EOSINOPHILS: 29
ABSOLUTE LYMPHOCYTES: 2297
ABSOLUTE MONOCYTES: 452
ABSOLUTE NEUTROPHILS: 3004
ALBUMIN: 4.4
ALKALINE PHOSPHATASE: 40
ALT (SGPT): 68
AST (SGOT): 29
BASOPHILS: 0.3
BILIRUBIN, TOTAL: 0.6
BUN/CREATININE RATIO: (no result)
BUN: 11
CALCIUM: 10
CARBON DIOXIDE, TOTAL: 25
CHLORIDE: 108
CREATININE: 1.22
EGFR: 77
EOSINOPHILS: 0.5
GLOBULIN, TOTAL: 2.8
GLUCOSE: 84
HEMATOCRIT: 40.9
HEMOGLOBIN: 13.9
LYMPHOCYTES: 39.6
MCH: 29.5
MCHC: 34
MCV: 86.8
MONOCYTES: 7.8
MPV: 8.9
NEUTROPHILS: 51.8
PLATELET COUNT: 318
POTASSIUM: 4.8
PROTEIN, TOTAL: 7.2
RDW: 16
RED BLOOD CELL COUNT: 4.71
SODIUM: 142
WHITE BLOOD CELL COUNT: 5.8

## 2022-12-07 ENCOUNTER — OFFICE VISIT (OUTPATIENT)
Dept: URBAN - METROPOLITAN AREA CLINIC 60 | Facility: CLINIC | Age: 42
End: 2022-12-07
Payer: COMMERCIAL

## 2022-12-07 ENCOUNTER — WEB ENCOUNTER (OUTPATIENT)
Dept: URBAN - METROPOLITAN AREA CLINIC 60 | Facility: CLINIC | Age: 42
End: 2022-12-07

## 2022-12-07 VITALS
WEIGHT: 221 LBS | HEIGHT: 69 IN | DIASTOLIC BLOOD PRESSURE: 80 MMHG | HEART RATE: 84 BPM | BODY MASS INDEX: 32.73 KG/M2 | OXYGEN SATURATION: 98 % | SYSTOLIC BLOOD PRESSURE: 130 MMHG | TEMPERATURE: 98.3 F | RESPIRATION RATE: 20 BRPM

## 2022-12-07 DIAGNOSIS — D12.6 BENIGN NEOPLASM OF COLON, UNSPECIFIED: ICD-10-CM

## 2022-12-07 DIAGNOSIS — K64.8 OTHER HEMORRHOIDS: ICD-10-CM

## 2022-12-07 DIAGNOSIS — K51.018 ULCERATIVE PANCOLITIS WITH OTHER COMPLICATION: ICD-10-CM

## 2022-12-07 PROCEDURE — 99213 OFFICE O/P EST LOW 20 MIN: CPT | Performed by: NURSE PRACTITIONER

## 2022-12-07 RX ORDER — EZETIMIBE 10 MG/1
TABLET ORAL
Qty: 90 TABLET | Refills: 0 | Status: ACTIVE | COMMUNITY

## 2022-12-07 RX ORDER — MESALAMINE 1.2 G/1
2 TABLETS WITH A MEAL TABLET, DELAYED RELEASE ORAL ONCE A DAY
Status: ACTIVE | COMMUNITY

## 2022-12-07 RX ORDER — ATORVASTATIN CALCIUM 10 MG/1
TABLET, FILM COATED ORAL
Qty: 90 TABLET | Refills: 0 | Status: ACTIVE | COMMUNITY

## 2022-12-07 RX ORDER — CALCIUM CARBONATE/VITAMIN D3 600 MG-10
1 CAPSULE TABLET ORAL ONCE A DAY
Status: ACTIVE | COMMUNITY

## 2022-12-07 RX ORDER — UPADACITINIB 45 MG/1
AS DIRECTED TABLET, EXTENDED RELEASE ORAL
Qty: 60 | Refills: 0 | Status: ACTIVE | COMMUNITY

## 2022-12-07 NOTE — HPI-TODAY'S VISIT:
5/17 Patient comes from [facility] with diagnosis of UC in 2016 with chronic diarrhea nd rectal bleeding, had a colonoscopy with evidence of pancolitis without lesion of the ileum,  Patient on prednisone 10 mg after high dose titration, and taking also azulfidine 1 gr  TID. Patient will need Labs and will check CRP and ESR, will check LFT. and CBC. Patient is anemic and on treatment with iron. Will need colonoscopy, and will do IBD panel if is needed. Will try to wean him off prednisone and will continue with same treatment for now but will consider remicade/humira. Will check hepatitis panel, was checked by TB. Will follow closely. discussion [Use for free text]. Discussed the need for appropriate follow-up care.  Patient will be placed in our recall system and a letter will be mailed to the patient.   6/17 Patient with UC, continue with multiples small BM with mucus and blood, no changes with the decreasing of steroids doses. Patient anemic with deficit of folate and Iron, probably as a side effect of use of Azulfidine. labs Increased CPR 4.5 Normal hepatitis panel and liver enzymes.  Plan Will DC Azulfidine.  Patient will start with Apriso. Continue with 2.5 mg of prednisone for now. Start with folic acid 5 mg max dose for now and continue with PO Iron.  Colonoscopy upon clinic status patient may will need treatment with Humira / Remicade.  8/17 Patient is feeling better, BM frequency are decreased, no hematochezia or mucus. Denies abdominal pain. Patient will continue treatment with folate and Iron, and with Lialda and will have colonoscopy. will do labs for folate and Iron next visit.  9/17 Patient is feeling better, gaining weight, more energy and general state, no BM during the night, 3-4 BM at day with no blood or mucus. Patient will do Lialda maintenance dose of 2.4 g at day. Colonoscopy with evidence of acute on chronic colon mucosa inflammation, , friable, vascular,pseudopolyps aspect of the colon mucosa, with normal mucosa of the terminal ileum  Patient will do Lialda maintenance dose of 2.4 g at day. will do labs in next visit.  1/19 Patient here today in good general state, he said recently has a flare of his UC, because he had not insurance or not medication. he had resume his treatment this month and is doing better but still more than 6 time in 24 hrs, no blood, or mucus, not abdominal pain. Also complaint of symptoms of gastritis. will plan colonoscopy and EGD upon clinical state and labs results. Patient may need treatment with biologic. Plan Patient will increase Lialda to 4.8 mg at day will start PPI. 3/19 Patient here today in good general state, he said has not more diarrhea or rectal bleeding regular bleeding. Stool studies are negative, anemia 11.1. CRP and ESR are normal. Patient will continue with Lialda, we will consider colonoscopy in 5 month. will start treatment with Iron. 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  Patient said has been doing better this lasts couple of month, he had just 1 event of rectal bleeding a month ago, he said is feeling better has more energy and no anemia.  Patient had a colonoscopy done 2 years ago. Plan: Patient will have laboratories to check renal function, H&H, and we have surveillance colonoscopy. Continue with Lialda.  9/19 Patient here today in good general state, patient denies any GI symptoms, no rectal bleeding, no abdominal pain, no mucus discharge.  Laboratories he is here for his colonoscopy results he was found having 4 mm adenoma polyps into the transverse colon, internal hemorrhoids active vascular, congested, erythematous, friable, pseudopolyps mucosa in the entire examined colon.  Patient with medical history of ulcerative colitis.  Patient hemoglobin improved to 14.7.  Normal platelets, normal WBC, and RBC, normal liver enzymes, normal renal function, and blood sugar.  Levels of B12 and folate are normal. Plan: We will refer him with Dr.Jennifer Wise to have a chrome endoscopy to rule out cancer or pre-cancer lesions.  Patient will continue with Lialda 1.2 gm 2 tablets twice a day. 11/19 Patient here today in good general state he denies any abdominal pain, diarrhea, rectal bleeding.  Patient has an appointment in [place] to be seen by Dr. Odell due to colonoscopy positive for adenoma in the seating of a Ulcerative Colitis, since Dr. Dalia Alicea is not doing this procedure in [place] anymore. Patient will continue treatment with Lialda. 2/20 Patient here today in good general state, he said is a symptomatic, has normal bowel movements, no rectal bleeding, no mucus discharge, no abdominal pain.  Patient did visited the [facility] specialist, he was recommended to start on Remicade, by patient report he going to start with Remicade on a month.  We will obtain records from [place].  Patient did not have the special colonoscopy done yet, he was advised to have several treatments of Remicade before he had a repeated colonoscopy. 4/20 Patient seems to be in good general state today, patient has medical history of severe ulcerative colitis with adenoma polyps of the colon.  He had no GI complaints, no abdominal pain, diarrhea, rectal bleeding, mucus discharge, fever, cough, sore throat.  He said he did not started with Remicade treatment because of the Covid 19 pandemia, he is afraid that his immune system get affected in the grade that he can acquire the virus.  Patient is taking Lialda at this time, doing diet, and is in self quarantine at home. Patient will continue in self isolation for now and will continue with Lialda until start with Remicade. 11/20 Patient here today in good general state, he said is feeling well, he completed his fourth doses of Remicade.  He said still using the Lialda 1.2 g bid.  Because when he stopped Lialda he started having diarrhea again.  But he also said he noticed the dual treatment with Remicade and Lialda work good for him. Plan: Patient will continue with this treatment.  And patient will have laboratories below to check on Remicade serum levels and drug antibody. Patient will need to visit his Dr in [place] in regard of his colonoscopy, since he had 4 dose of Remicade already to decide if he should continue with this treatment based on his pathologic and laboratory results or not. 1/21 Patient here today in good general state, patient laboratories shows evidence of: CBC with normal hemoglobin, WBC, and RBC,  CMP: Liver function, bilirubin, proteins, kidney function, and glucose are normal.  Remicade levels are normal, Remicade antibodies slightly elevated 22. Patient here today in good general state, he has not GI complaints today.  He said have no abdominal pain, diarrhea, rectal bleeding or any other GI symptoms.  Patient did not visit Dr. Odell in [facility] yet for his colonoscopy, he said he is planning to see her next month. Patient will continue with treatment with Lialda and Remicade, we will check antibodies and level of the Remicade in 4 months. 1/22 Patient here today in good general state.  He had no GI complaints.  Denies any diarrhea, rectal discharge, abdominal pain, or rectal bleeding.  Laboratories CBC was normal CMP shows evidence of elevated creatinine and low glomerular filtration rate.  Patient liver enzymes are normal.  Remicade antibodies are elevated more than 100 medication level is 4.0. Plan: We will repeat laboratory to check on liver function.  And also will repeat laboratories for increasing mid back level and antibody on [facility] laboratories.   Patient is a symptomatic but, we may increase the frequency of the Remicade infusions upon patient laboratories to decrease the chance of flares. Patient has medical history of large tubular adenoma polyps 2 years ago we will plan colonoscopy in his next office visit.  2/22 Patient here today in good general state.  He denies any GI symptoms, abdominal pain, diarrhea, rectal bleeding or any sign of ulcerative colitis flare. Laboratories done at [facility] shows evidence of none detectable serum Infliximab less than 1.0 and detectable antibodies to infliximab 5.0. 6/22 Colonoscopy report demonstrate two 12 to 15 mm polyps into the mid descending colon, removed.  A diminutive polyp into the mid ascending colon, removed.  Pseudopolyps in the entire examined colon.  Inflamed mucosa in the rectum, in the descending colon, at the splenic flexure, in the transverse colon, at the hepatic flexure, in the ascending and in the cecum/ biopsied.  The examined portion of the ileum was normal.  No bleeding internal hemorrhoids. Biopsy results shows evidence of terminal ileum biopsy with a small bowel mucosa with mild chronic enteritis.  Right colon biopsy chronic colitis.  Ascending colon polypectomy: Inflammatory pseudopolyp.  Left colon biopsy: Chronic active colitis with isolated cryptitis.  Mid descending colon polypectomy: Chronic active colitis with ulcerations.  Sigmoid colon biopsy: Chronic colitis with distortion, no cryptitis.  Rectum biopsy: Chronic active colitis.  Today patient is in good general state he has no GI complaints he denies any rectal bleeding, rectal discharge, diarrhea, constipation, abdominal pain or any other GI symptoms. Laboratories done on June 14, 2022 demonstrate lipid panel with slightly elevated triglyceride 212, LDL cholesterol 105, non-HDL cholesterol 138.  CMP glomerular filtration rate 80, creatinine 1.15 normal, normal liver enzymes and blood sugar.  CBC normal, TSH normal. 8/22  Patient here today in good general state. He denies any rectal bleeding, abdominal pain, diarrhea, or constipation, bowel movements around 3-4 a day. Some mucus discharge on and off. Patient states he is feeling fairly well. we have a lengthy conversation with him about his colon progressing ulcerative colitis condition demonstrated by her last colonoscopy/ biosy results, and possible future complications as severe flares, cancer, colon resection.  Patient has been in Remicade and mesalamine through the last 2-year, with no improvement of the colonic mucosa, with findings of tubular adenoma polyps, pseudopolyps, and extensive ulcerative process throughout the colon. Patient laboratories showed adequate levels of Infliximab the day before of the Remicade infusion and 3 weeks after the infusion with no antibodies present, despite of these laboratory results and based his colonoscopy/pathology results we think these treatment is not working for him.  So, we think it is better for this patient to stop Remicade infusion and mesalamine and start treatment with RinvoQ 45 mg daily for 8 weeks and upon patient clinical course will decrease to 35 mg daily maintenance dose.  9/22 Patient here today in good general state he is started on Rinvoq 45 mg daily 5 weeks ago.  Patient noted improvement on his bowel movements in general state.  He will continue with 45 mg of RinvoQ for 3 more weeks and will decrease it to 35 mg as mantenience doses. 12/22 Patient is here today in good general state, he denies any rectal bleeding, diarrhea, abdominal pain or any other GI symptoms he is having 1 or 2 bowel movements daily.  He is on 35 mg of Rinvoq daily.

## 2022-12-09 PROBLEM — 444548001: Status: ACTIVE | Noted: 2022-08-12

## 2023-01-05 ENCOUNTER — TELEPHONE ENCOUNTER (OUTPATIENT)
Dept: URBAN - METROPOLITAN AREA CLINIC 60 | Facility: CLINIC | Age: 43
End: 2023-01-05

## 2023-01-13 ENCOUNTER — TELEPHONE ENCOUNTER (OUTPATIENT)
Dept: URBAN - METROPOLITAN AREA CLINIC 60 | Facility: CLINIC | Age: 43
End: 2023-01-13

## 2023-01-23 ENCOUNTER — ERX REFILL RESPONSE (OUTPATIENT)
Dept: URBAN - METROPOLITAN AREA CLINIC 63 | Facility: CLINIC | Age: 43
End: 2023-01-23

## 2023-01-23 RX ORDER — MESALAMINE 1.2 G/1
TAKE 4 TABLETS BY MOUTH EVERY DAY TABLET, DELAYED RELEASE ORAL
Qty: 360 TABLET | Refills: 0 | OUTPATIENT

## 2023-01-23 RX ORDER — MESALAMINE 1.2 G/1
2 TABLETS WITH A MEAL TABLET, DELAYED RELEASE ORAL ONCE A DAY
OUTPATIENT

## 2023-02-13 ENCOUNTER — TELEPHONE ENCOUNTER (OUTPATIENT)
Dept: URBAN - METROPOLITAN AREA CLINIC 63 | Facility: CLINIC | Age: 43
End: 2023-02-13

## 2023-02-13 RX ORDER — MESALAMINE 1.2 G/1
TAKE 4 TABLETS BY MOUTH EVERY DAY TABLET, DELAYED RELEASE ORAL
Qty: 360 TABLET | Refills: 0

## 2023-03-08 ENCOUNTER — OFFICE VISIT (OUTPATIENT)
Dept: URBAN - METROPOLITAN AREA CLINIC 60 | Facility: CLINIC | Age: 43
End: 2023-03-08

## 2023-03-23 ENCOUNTER — ERX REFILL RESPONSE (OUTPATIENT)
Dept: URBAN - METROPOLITAN AREA CLINIC 63 | Facility: CLINIC | Age: 43
End: 2023-03-23

## 2023-03-23 RX ORDER — MESALAMINE 1.2 G/1
TAKE 4 TABLETS BY MOUTH EVERY DAY TABLET, DELAYED RELEASE ORAL
Qty: 360 TABLET | Refills: 0 | OUTPATIENT

## 2023-04-07 ENCOUNTER — ERX REFILL RESPONSE (OUTPATIENT)
Dept: URBAN - METROPOLITAN AREA CLINIC 63 | Facility: CLINIC | Age: 43
End: 2023-04-07

## 2023-04-07 RX ORDER — MESALAMINE 1.2 G/1
TAKE 4 TABLETS BY MOUTH EVERY DAY TABLET, DELAYED RELEASE ORAL
Qty: 360 TABLET | Refills: 0 | OUTPATIENT

## 2023-04-07 RX ORDER — MESALAMINE 1.2 G/1
TAKE 4 TABLETS BY MOUTH EVERY DAY TABLET, DELAYED RELEASE ORAL
Qty: 120 TABLET | Refills: 2 | OUTPATIENT

## 2023-06-01 ENCOUNTER — LAB OUTSIDE AN ENCOUNTER (OUTPATIENT)
Dept: URBAN - METROPOLITAN AREA CLINIC 60 | Facility: CLINIC | Age: 43
End: 2023-06-01

## 2023-07-06 ENCOUNTER — ERX REFILL RESPONSE (OUTPATIENT)
Dept: URBAN - METROPOLITAN AREA CLINIC 63 | Facility: CLINIC | Age: 43
End: 2023-07-06

## 2023-07-06 RX ORDER — MESALAMINE 1.2 G/1
TAKE 4 TABLETS BY MOUTH EVERY DAY TABLET, DELAYED RELEASE ORAL
Qty: 120 TABLET | Refills: 2 | OUTPATIENT

## 2023-08-25 ENCOUNTER — TELEPHONE ENCOUNTER (OUTPATIENT)
Dept: URBAN - METROPOLITAN AREA CLINIC 63 | Facility: CLINIC | Age: 43
End: 2023-08-25

## 2023-08-25 RX ORDER — MESALAMINE 1.2 G/1
TAKE 4 TABLETS BY MOUTH EVERY DAY TABLET, DELAYED RELEASE ORAL
Qty: 120 TABLET | Refills: 2

## 2023-11-13 ENCOUNTER — TELEPHONE ENCOUNTER (OUTPATIENT)
Dept: URBAN - METROPOLITAN AREA CLINIC 60 | Facility: CLINIC | Age: 43
End: 2023-11-13

## 2023-11-15 ENCOUNTER — OFFICE VISIT (OUTPATIENT)
Dept: URBAN - METROPOLITAN AREA CLINIC 60 | Facility: CLINIC | Age: 43
End: 2023-11-15
Payer: COMMERCIAL

## 2023-11-15 VITALS
SYSTOLIC BLOOD PRESSURE: 110 MMHG | WEIGHT: 229 LBS | BODY MASS INDEX: 33.92 KG/M2 | HEART RATE: 84 BPM | OXYGEN SATURATION: 99 % | RESPIRATION RATE: 20 BRPM | HEIGHT: 69 IN | TEMPERATURE: 96.7 F | DIASTOLIC BLOOD PRESSURE: 70 MMHG

## 2023-11-15 DIAGNOSIS — D12.6 BENIGN NEOPLASM OF COLON, UNSPECIFIED: ICD-10-CM

## 2023-11-15 DIAGNOSIS — K64.8 OTHER HEMORRHOIDS: ICD-10-CM

## 2023-11-15 DIAGNOSIS — K51.018 ULCERATIVE PANCOLITIS WITH OTHER COMPLICATION: ICD-10-CM

## 2023-11-15 PROCEDURE — 99214 OFFICE O/P EST MOD 30 MIN: CPT | Performed by: NURSE PRACTITIONER

## 2023-11-15 RX ORDER — CALCIUM CARBONATE/VITAMIN D3 600 MG-10
1 CAPSULE TABLET ORAL ONCE A DAY
Status: ACTIVE | COMMUNITY

## 2023-11-15 RX ORDER — MESALAMINE 1.2 G/1
TAKE 4 TABLETS BY MOUTH EVERY DAY TABLET, DELAYED RELEASE ORAL
Qty: 120 TABLET | Refills: 2 | Status: ACTIVE | COMMUNITY

## 2023-11-15 RX ORDER — UPADACITINIB 45 MG/1
AS DIRECTED TABLET, EXTENDED RELEASE ORAL
Qty: 60 | Refills: 0 | Status: ACTIVE | COMMUNITY

## 2023-11-15 NOTE — HPI-TODAY'S VISIT:
5/17 Patient comes from [facility] with diagnosis of UC in 2016 with chronic diarrhea nd rectal bleeding, had a colonoscopy with evidence of pancolitis without lesion of the ileum,  Patient on prednisone 10 mg after high dose titration, and taking also Azulfidine 1 gr  TID. Patient will need Labs and will check CRP and ESR, will check LFT. And CBC. Patient is anemic and on treatment with iron. Will need colonoscopy, and will do IBD panel if is needed. Will try to wean him off prednisone and will continue with same treatment for now but will consider Remicade/Humira. Will check hepatitis panel, was checked by TB. Will follow closely. Discussion [Use for free text]. Discussed the need for appropriate follow-up care.  Patient will be placed in our recall system and a letter will be mailed to the patient.   6/17 Patient with UC, continue with multiples small BM with mucus and blood, no changes with the decreasing of steroids doses. Patient anemic with deficit of folate and Iron, probably as a side effect of use of Azulfidine. Labs Increased CPR 4.5 Normal hepatitis panel and liver enzymes.  Plan Will DC Azulfidine.  Patient will start with Apriso. Continue with 2.5 mg of prednisone for now. Start with folic acid 5 mg max dose for now and continue with PO Iron.  Colonoscopy upon clinic status patient may will need treatment with Humira / Remicade.  8/17 Patient is feeling better, BM frequency are decreased, no hematochezia or mucus. Denies abdominal pain. Patient will continue treatment with folate and Iron, and with Lialda and will have colonoscopy. Will do labs for folate and Iron next visit.  9/17 Patient is feeling better, gaining weight, more energy and general state, no BM during the night, 3-4 BM at day with no blood or mucus. Patient will do Lialda maintenance dose of 2.4 g at day. Colonoscopy with evidence of acute on chronic colon mucosa inflammation,, friable, vascular, pseudopolyps aspect of the colon mucosa, with normal mucosa of the terminal ileum  Patient will do Lialda maintenance dose of 2.4 g at day. Will do labs in next visit.  1/19 Patient here today in good general state, he said recently has a flare of his UC, because he had not insurance or not medication. He had resumed his treatment this month and is doing better but still more than 6 time in 24 hrs, no blood, or mucus, not abdominal pain. Also, complaint of symptoms of gastritis. Will plan colonoscopy and EGD upon clinical state and labs results. Patient may need treatment with biologic. Plan Patient will increase Lialda to 4.8 mg at day will start PPI. 3/19 Patient here today in good general state, he said has not more diarrhea or rectal bleeding regular bleeding. Stool studies are negative, anemia 11.1. CRP and ESR are normal. Patient will continue with Lialda, we will consider colonoscopy in 5 months. Will start treatment with Iron. 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  Patient said has been doing better this lasts couple of month, he had just 1 event of rectal bleeding a month ago, he said is feeling better has more energy and no anemia.  Patient had a colonoscopy done 2 years ago. Plan: Patient will have laboratories to check renal function, H&H, and we have surveillance colonoscopy. Continue with Lialda.  9/19 Patient here today in good general state, patient denies any GI symptoms, no rectal bleeding, no abdominal pain, no mucus discharge.  Laboratories he is here for his colonoscopy results he was found having 4 mm adenoma polyps into the transverse colon, internal hemorrhoids active vascular, congested, erythematous, friable, pseudopolyps mucosa in the entire examined colon.  Patient with medical history of ulcerative colitis.  Patient hemoglobin improved to 14.7.  Normal platelets, normal WBC, and RBC, normal liver enzymes, normal renal function, and blood sugar.  Levels of B12 and folate are normal. Plan: We will refer him with Dr.Jennifer Wise to have a chrome endoscopy to rule out cancer or pre-cancer lesions.  Patient will continue with Lialda 1.2 gm 2 tablets twice a day. 11/19 Patient here today in good general state he denies any abdominal pain, diarrhea, rectal bleeding.  Patient has an appointment in [place] to be seen by Dr. Odell due to colonoscopy positive for adenoma in the seating of an Ulcerative Colitis, since Dr. Dalia Alicea is not doing this procedure in [place] anymore. Patient will continue treatment with Lialda. 2/20 Patient here today in good general state, he said is a symptomatic, has normal bowel movements, no rectal bleeding, no mucus discharge, no abdominal pain.  Patient did visit the [facility] specialist, he was recommended to start on Remicade, by patient report he's going to start with Remicade on a month.  We will obtain records from [place].  Patient did not have the special colonoscopy done yet, he was advised to have several treatments of Remicade before he had a repeated colonoscopy. 4/20 Patient seems to be in good general state today, patient has medical history of severe ulcerative colitis with adenoma polyps of the colon.  He had no GI complaints, no abdominal pain, diarrhea, rectal bleeding, mucus discharge, fever, cough, sore throat.  He said he did not start with Remicade treatment because of the COVID-19 pandemia, he is afraid that his immune system get affected in the grade that he can acquire the virus.  Patient is taking Lialda at this time, doing diet, and is in self-quarantine at home. Patient will continue in self-isolation for now and will continue with Lialda until start with Remicade. 11/20 Patient here today in good general state, he said is feeling well, he completed his fourth doses of Remicade.  He said still using the Lialda 1.2 g bid.  Because when he stopped Lialda he started having diarrhea again.  But he also said he noticed the dual treatment with Remicade and Lialda work good for him. Plan: Patient will continue with this treatment.  And patient will have laboratories below to check on Remicade serum levels and drug antibody. Patient will need to visit his Dr in [place] in regard of his colonoscopy, since he had 4 doses of Remicade already to decide if he should continue with this treatment based on his pathologic and laboratory results or not. 1/21 Patient here today in good general state, patient laboratories shows evidence of: CBC with normal hemoglobin, WBC, and RBC,  CMP: Liver function, bilirubin, proteins, kidney function, and glucose are normal.  Remicade levels are normal, Remicade antibodies slightly elevated 22. Patient here today in good general state, he has not GI complaints today.  He said have no abdominal pain, diarrhea, rectal bleeding or any other GI symptoms.  Patient did not visit Dr. Odell in [facility] yet for his colonoscopy, he said he is planning to see her next month. Patient will continue with treatment with Lialda and Remicade, we will check antibodies and level of the Remicade in 4 months. 1/22 Patient here today in good general state.  He had no GI complaints.  Denies any diarrhea, rectal discharge, abdominal pain, or rectal bleeding.  Laboratories CBC was normal CMP shows evidence of elevated creatinine and low glomerular filtration rate.  Patient liver enzymes are normal.  Remicade antibodies are elevated more than 100 medication level is 4.0. Plan: We will repeat laboratory to check on liver function.  And also will repeat laboratories for increasing mid back level and [email or web address].   Patient is a symptomatic but, we may increase the frequency of the Remicade infusions upon patient laboratories to decrease the chance of flares. Patient has medical history of large tubular adenoma polyps 2 years ago we will plan colonoscopy in his next office visit.  2/22 Patient here today in good general state.  He denies any GI symptoms, abdominal pain, diarrhea, rectal bleeding or any sign of ulcerative colitis flare. Laboratories done at [facility] shows evidence of none detectable serum Infliximab less than 1.0 and detectable antibodies to infliximab 5.0. 6/22 Colonoscopy report demonstrate two 12 to 15 mm polyps into the mid descending colon, removed.  A diminutive polyp into the mid ascending colon, removed.  Pseudopolyps in the entire examined colon.  Inflamed mucosa in the rectum, in the descending colon, at the splenic flexure, in the transverse colon, at the hepatic flexure, in the ascending and in the cecum/ biopsied.  The examined portion of the ileum was normal.  No bleeding internal hemorrhoids. Biopsy results shows evidence of terminal ileum biopsy with a small bowel mucosa with mild chronic enteritis.  Right colon biopsy chronic colitis.  Ascending colon polypectomy: Inflammatory pseudopolyp.  Left colon biopsy: Chronic active colitis with isolated cryptitis.  Mid descending colon polypectomy: Chronic active colitis with ulcerations.  Sigmoid colon biopsy: Chronic colitis with distortion, no cryptitis.  Rectum biopsy: Chronic active colitis.  Today patient is in good general state he has no GI complaints he denies any rectal bleeding, rectal discharge, diarrhea, constipation, abdominal pain or any other GI symptoms. Laboratories done on June 14, 2022, demonstrate lipid panel with slightly elevated triglyceride 212, LDL cholesterol 105, non-HDL cholesterol 138.  CMP glomerular filtration rate 80, creatinine 1.15 normal, normal liver enzymes and blood sugar.  CBC normal, TSH normal. 8/22  Patient here today in good general state. He denies any rectal bleeding, abdominal pain, diarrhea, or constipation, bowel movements around 3-4 a day. Some mucus discharge on and off. Patient states he is feeling fairly well. We have a lengthy conversation with him about his colon progressing ulcerative colitis condition demonstrated by her last colonoscopy/ biopsy results, and possible future complications as severe flares, cancer, colon resection.  Patient has been in Remicade and mesalamine through the last 2-year, with no improvement of the colonic mucosa, with findings of tubular adenoma polyps, pseudopolyps, and extensive ulcerative process throughout the colon. Patient laboratories showed adequate levels of Infliximab the day before of the Remicade infusion and 3 weeks after the infusion with no antibodies present, despite these laboratory results and based his colonoscopy/pathology results we think this treatment is not working for him.  So, we think it is better for this patient to stop Remicade infusion and mesalamine and start treatment with Rinvoq 45 mg daily for 8 weeks and upon patient clinical course will decrease to 35 mg daily maintenance dose.  9/22 Patient here today in good general state he is started on Rinvoq 45 mg daily 5 weeks ago.  Patient noted improvement on his bowel movements in general state.  He will continue with 45 mg of Rinvoq for 3 more weeks and will decrease it to 35 mg as maintenance doses. 12/22 Patient is here today in good general state, he denies any rectal bleeding, diarrhea, abdominal pain or any other GI symptoms he is having 1 or 2 bowel movements daily.  He is on 35 mg of Rinvoq daily. 11/23 Patient here today for his colonoscopy follow-up.  Patient has medical history of ulcerative colitis.  Patient was started on green box and also continue on mesalamine, and he was doing good.  He said he's still doing good but, he is now at having green box anymore because he was lacking of medical insurance. Patient denies any rectal bleeding, abdominal pain, nausea, vomited, bowel movements are 2 or 3 a day, with no blood or mucus in it.Patient will resume treatment with Rinvoq 45 mg daily, continue.

## 2023-11-28 ENCOUNTER — ERX REFILL RESPONSE (OUTPATIENT)
Dept: URBAN - METROPOLITAN AREA CLINIC 63 | Facility: CLINIC | Age: 43
End: 2023-11-28

## 2023-11-28 RX ORDER — MESALAMINE 1.2 G/1
TAKE 4 TABLETS BY MOUTH EVERY DAY TABLET, DELAYED RELEASE ORAL
Qty: 120 TABLET | Refills: 2 | OUTPATIENT

## 2023-12-11 ENCOUNTER — TELEPHONE ENCOUNTER (OUTPATIENT)
Dept: URBAN - METROPOLITAN AREA CLINIC 63 | Facility: CLINIC | Age: 43
End: 2023-12-11

## 2023-12-12 LAB
A/G RATIO: 1.6
ALBUMIN: 4.5
ALKALINE PHOSPHATASE: 47
ALT (SGPT): 73
AST (SGOT): 34
BILIRUBIN, TOTAL: 0.4
BUN/CREATININE RATIO: (no result)
BUN: 9
C-REACTIVE PROTEIN, QUANT: <0.2
CALCIUM: 9.6
CARBON DIOXIDE, TOTAL: 27
CHLORIDE: 106
CREATININE: 1.09
EGFR: 87
GLOBULIN, TOTAL: 2.9
GLUCOSE: 79
HEMATOCRIT: 44.1
HEMOGLOBIN: 14
HEP B CORE AB, IGM: (no result)
HEPATITIS B SURFACE ANTIGEN: (no result)
MCH: 27.8
MCHC: 31.7
MCV: 87.7
MITOGEN-NIL: 9.33
MPV: 9.4
PLATELET COUNT: 284
POTASSIUM: 4.3
PROTEIN, TOTAL: 7.4
QUANTIFERON NIL VALUE: 0.02
QUANTIFERON TB1 AG VALUE: 0
QUANTIFERON TB2 AG VALUE: 0
QUANTIFERON-TB GOLD PLUS: NEGATIVE
RDW: 15
RED BLOOD CELL COUNT: 5.03
SODIUM: 141
WHITE BLOOD CELL COUNT: 3.8

## 2023-12-27 ENCOUNTER — OFFICE VISIT (OUTPATIENT)
Dept: URBAN - METROPOLITAN AREA CLINIC 60 | Facility: CLINIC | Age: 43
End: 2023-12-27
Payer: COMMERCIAL

## 2023-12-27 VITALS
DIASTOLIC BLOOD PRESSURE: 76 MMHG | TEMPERATURE: 97.9 F | RESPIRATION RATE: 20 BRPM | OXYGEN SATURATION: 99 % | BODY MASS INDEX: 34.07 KG/M2 | HEART RATE: 94 BPM | WEIGHT: 230 LBS | HEIGHT: 69 IN | SYSTOLIC BLOOD PRESSURE: 124 MMHG

## 2023-12-27 DIAGNOSIS — D12.6 BENIGN NEOPLASM OF COLON, UNSPECIFIED: ICD-10-CM

## 2023-12-27 DIAGNOSIS — K64.8 OTHER HEMORRHOIDS: ICD-10-CM

## 2023-12-27 DIAGNOSIS — K51.018 ULCERATIVE PANCOLITIS WITH OTHER COMPLICATION: ICD-10-CM

## 2023-12-27 PROCEDURE — 99214 OFFICE O/P EST MOD 30 MIN: CPT | Performed by: NURSE PRACTITIONER

## 2023-12-27 RX ORDER — CALCIUM CARBONATE/VITAMIN D3 600 MG-10
1 CAPSULE TABLET ORAL ONCE A DAY
Status: ACTIVE | COMMUNITY

## 2023-12-27 RX ORDER — MESALAMINE 1.2 G/1
TAKE 4 TABLETS BY MOUTH EVERY DAY TABLET, DELAYED RELEASE ORAL
Qty: 120 TABLET | Refills: 2 | Status: ACTIVE | COMMUNITY

## 2023-12-27 RX ORDER — UPADACITINIB 30 MG/1
1 TABLET TABLET, EXTENDED RELEASE ORAL ONCE A DAY
Qty: 30 | OUTPATIENT
Start: 2023-12-27 | End: 2024-01-26

## 2023-12-27 RX ORDER — UPADACITINIB 45 MG/1
AS DIRECTED TABLET, EXTENDED RELEASE ORAL
Qty: 60 | Refills: 0 | Status: ACTIVE | COMMUNITY

## 2023-12-27 RX ORDER — ROSUVASTATIN CALCIUM 20 MG/1
TABLET, FILM COATED ORAL
Qty: 90 TABLET | Refills: 0 | Status: ACTIVE | COMMUNITY

## 2023-12-27 NOTE — HPI-TODAY'S VISIT:
5/17 Patient comes from [facility] with diagnosis of UC in 2016 with chronic diarrhea nd rectal bleeding, had a colonoscopy with evidence of pancolitis without lesion of the ileum,  Patient on prednisone 10 mg after high dose titration, and taking also Azulfidine 1 gr  TID. Patient will need Labs and will check CRP and ESR, will check LFT. And CBC. Patient is anemic and on treatment with iron. Will need colonoscopy, and will do IBD panel if is needed. Will try to wean him off prednisone and will continue with same treatment for now but will consider Remicade/Humira. Will check hepatitis panel, was checked by TB. Will follow closely. Discussion [Use for free text]. Discussed the need for appropriate follow-up care.  Patient will be placed in our recall system and a letter will be mailed to the patient.   6/17 Patient with UC, continue with multiples small BM with mucus and blood, no changes with the decreasing of steroids doses. Patient anemic with deficit of folate and Iron, probably as a side effect of use of Azulfidine. Labs Increased CPR 4.5 Normal hepatitis panel and liver enzymes.  Plan Will DC Azulfidine.  Patient will start with Apriso. Continue with 2.5 mg of prednisone for now. Start with folic acid 5 mg max dose for now and continue with PO Iron.  Colonoscopy upon clinic status patient may will need treatment with Humira / Remicade.  8/17 Patient is feeling better, BM frequency are decreased, no hematochezia or mucus. Denies abdominal pain. Patient will continue treatment with folate and Iron, and with Lialda and will have colonoscopy. Will do labs for folate and Iron next visit.  9/17 Patient is feeling better, gaining weight, more energy and general state, no BM during the night, 3-4 BM at day with no blood or mucus. Patient will do Lialda maintenance dose of 2.4 g at day. Colonoscopy with evidence of acute on chronic colon mucosa inflammation,, friable, vascular, pseudopolyps aspect of the colon mucosa, with normal mucosa of the terminal ileum  Patient will do Lialda maintenance dose of 2.4 g at day. Will do labs in next visit.  1/19 Patient here today in good general state, he said recently has a flare of his UC, because he had not insurance or not medication. He had resumed his treatment this month and is doing better but still more than 6 time in 24 hrs, no blood, or mucus, not abdominal pain. Also, complaint of symptoms of gastritis. Will plan colonoscopy and EGD upon clinical state and labs results. Patient may need treatment with biologic. Plan Patient will increase Lialda to 4.8 mg at day will start PPI. 3/19 Patient here today in good general state, he said has not more diarrhea or rectal bleeding regular bleeding. Stool studies are negative, anemia 11.1. CRP and ESR are normal. Patient will continue with Lialda, we will consider colonoscopy in 5 months. Will start treatment with Iron. 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  Patient said has been doing better this lasts couple of month, he had just 1 event of rectal bleeding a month ago, he said is feeling better has more energy and no anemia.  Patient had a colonoscopy done 2 years ago. Plan: Patient will have laboratories to check renal function, H&H, and we have surveillance colonoscopy. Continue with Lialda.  9/19 Patient here today in good general state, patient denies any GI symptoms, no rectal bleeding, no abdominal pain, no mucus discharge.  Laboratories he is here for his colonoscopy results he was found having 4 mm adenoma polyps into the transverse colon, internal hemorrhoids active vascular, congested, erythematous, friable, pseudopolyps mucosa in the entire examined colon.  Patient with medical history of ulcerative colitis.  Patient hemoglobin improved to 14.7.  Normal platelets, normal WBC, and RBC, normal liver enzymes, normal renal function, and blood sugar.  Levels of B12 and folate are normal. Plan: We will refer him with Dr.Jennifer Wise to have a chrome endoscopy to rule out cancer or pre-cancer lesions.  Patient will continue with Lialda 1.2 gm 2 tablets twice a day. 11/19 Patient here today in good general state he denies any abdominal pain, diarrhea, rectal bleeding.  Patient has an appointment in [place] to be seen by Dr. Odell due to colonoscopy positive for adenoma in the seating of an Ulcerative Colitis, since Dr. Dalia Alicea is not doing this procedure in [place] anymore. Patient will continue treatment with Lialda. 2/20 Patient here today in good general state, he said is a symptomatic, has normal bowel movements, no rectal bleeding, no mucus discharge, no abdominal pain.  Patient did visit the [facility] specialist, he was recommended to start on Remicade, by patient report he's going to start with Remicade on a month.  We will obtain records from [place].  Patient did not have the special colonoscopy done yet, he was advised to have several treatments of Remicade before he had a repeated colonoscopy. 4/20 Patient seems to be in good general state today, patient has medical history of severe ulcerative colitis with adenoma polyps of the colon.  He had no GI complaints, no abdominal pain, diarrhea, rectal bleeding, mucus discharge, fever, cough, sore throat.  He said he did not start with Remicade treatment because of the COVID-19 pandemia, he is afraid that his immune system get affected in the grade that he can acquire the virus.  Patient is taking Lialda at this time, doing diet, and is in self-quarantine at home. Patient will continue in self-isolation for now and will continue with Lialda until start with Remicade. 11/20 Patient here today in good general state, he said is feeling well, he completed his fourth doses of Remicade.  He said still using the Lialda 1.2 g bid.  Because when he stopped Lialda he started having diarrhea again.  But he also said he noticed the dual treatment with Remicade and Lialda work good for him. Plan: Patient will continue with this treatment.  And patient will have laboratories below to check on Remicade serum levels and drug antibody. Patient will need to visit his Dr in [place] in regard of his colonoscopy, since he had 4 doses of Remicade already to decide if he should continue with this treatment based on his pathologic and laboratory results or not. 1/21 Patient here today in good general state, patient laboratories shows evidence of: CBC with normal hemoglobin, WBC, and RBC,  CMP: Liver function, bilirubin, proteins, kidney function, and glucose are normal.  Remicade levels are normal, Remicade antibodies slightly elevated 22. Patient here today in good general state, he has not GI complaints today.  He said have no abdominal pain, diarrhea, rectal bleeding or any other GI symptoms.  Patient did not visit Dr. Odell in [facility] yet for his colonoscopy, he said he is planning to see her next month. Patient will continue with treatment with Lialda and Remicade, we will check antibodies and level of the Remicade in 4 months. 1/22 Patient here today in good general state.  He had no GI complaints.  Denies any diarrhea, rectal discharge, abdominal pain, or rectal bleeding.  Laboratories CBC was normal CMP shows evidence of elevated creatinine and low glomerular filtration rate.  Patient liver enzymes are normal.  Remicade antibodies are elevated more than 100 medication level is 4.0. Plan: We will repeat laboratory to check on liver function.  And also will repeat laboratories for increasing mid back level and antibody [facility].   Patient is a symptomatic but, we may increase the frequency of the Remicade infusions upon patient laboratories to decrease the chance of flares. Patient has medical history of large tubular adenoma polyps 2 years ago we will plan colonoscopy in his next office visit.  2/22 Patient here today in good general state.  He denies any GI symptoms, abdominal pain, diarrhea, rectal bleeding or any sign of ulcerative colitis flare. Laboratories done at [facility] shows evidence of none detectable serum Infliximab less than 1.0 and detectable antibodies to infliximab 5.0. 6/22 Colonoscopy report demonstrate two 12 to 15 mm polyps into the mid descending colon, removed.  A diminutive polyp into the mid ascending colon, removed.  Pseudopolyps in the entire examined colon.  Inflamed mucosa in the rectum, in the descending colon, at the splenic flexure, in the transverse colon, at the hepatic flexure, in the ascending and in the cecum/ biopsied.  The examined portion of the ileum was normal.  No bleeding internal hemorrhoids. Biopsy results shows evidence of terminal ileum biopsy with a small bowel mucosa with mild chronic enteritis.  Right colon biopsy chronic colitis.  Ascending colon polypectomy: Inflammatory pseudopolyp.  Left colon biopsy: Chronic active colitis with isolated cryptitis.  Mid descending colon polypectomy: Chronic active colitis with ulcerations.  Sigmoid colon biopsy: Chronic colitis with distortion, no cryptitis.  Rectum biopsy: Chronic active colitis.  Today patient is in good general state he has no GI complaints he denies any rectal bleeding, rectal discharge, diarrhea, constipation, abdominal pain or any other GI symptoms. Laboratories done on June 14, 2022, demonstrate lipid panel with slightly elevated triglyceride 212, LDL cholesterol 105, non-HDL cholesterol 138.  CMP glomerular filtration rate 80, creatinine 1.15 normal, normal liver enzymes and blood sugar.  CBC normal, TSH normal. 8/22  Patient here today in good general state. He denies any rectal bleeding, abdominal pain, diarrhea, or constipation, bowel movements around 3-4 a day. Some mucus discharge on and off. Patient states he is feeling fairly well. We have a lengthy conversation with him about his colon progressing ulcerative colitis condition demonstrated by her last colonoscopy/ biopsy results, and possible future complications as severe flares, cancer, colon resection.  Patient has been in Remicade and mesalamine through the last 2-year, with no improvement of the colonic mucosa, with findings of tubular adenoma polyps, pseudopolyps, and extensive ulcerative process throughout the colon. Patient laboratories showed adequate levels of Infliximab the day before of the Remicade infusion and 3 weeks after the infusion with no antibodies present, despite these laboratory results and based his colonoscopy/pathology results we think this treatment is not working for him.  So, we think it is better for this patient to stop Remicade infusion and mesalamine and start treatment with Rinvoq 45 mg daily for 8 weeks and upon patient clinical course will decrease to 35 mg daily maintenance dose.  9/22 Patient here today in good general state he is started on Rinvoq 45 mg daily 5 weeks ago.  Patient noted improvement on his bowel movements in general state.  He will continue with 45 mg of Rinvoq for 3 more weeks and will decrease it to 35 mg as maintenance doses. 12/22 Patient is here today in good general state, he denies any rectal bleeding, diarrhea, abdominal pain or any other GI symptoms he is having 1 or 2 bowel movements daily.  He is on 35 mg of Rinvoq daily. 11/23 Patient has medical history of ulcerative colitis.  Patient was started on Rinvoq and also continue on mesalamine, and he was doing good.  He said he's still doing good but, he is now at having green box anymore because he was lacking of medical insurance. Patient denies any rectal bleeding, abdominal pain, nausea, vomited, bowel movements are 2 or 3 a day, with no blood or mucus in it. Patient will resume treatment with Rinvoq 45 mg daily, continue.   12/23 Patient is here today in good general state he has no GI complaints, denying any abdominal pain, diarrhea, rectal discharge, rectal bleeding.  He is on Rinvoq 30 mg daily and mesalamine.laboratories shows evidence of negative hepatitis B, negative QuantiFERO -TB Gold, slightly elevated A 73, ST ALT.  Normal.  Normal kidney function, and normal CRP.   Surveillance colonoscopy.

## 2023-12-28 ENCOUNTER — TELEPHONE ENCOUNTER (OUTPATIENT)
Dept: URBAN - METROPOLITAN AREA CLINIC 60 | Facility: CLINIC | Age: 43
End: 2023-12-28

## 2023-12-28 RX ORDER — UPADACITINIB 30 MG/1
1 TABLET TABLET, EXTENDED RELEASE ORAL ONCE A DAY
Qty: 30 | Refills: 5
Start: 2023-12-27 | End: 2024-06-26

## 2024-01-03 ENCOUNTER — LAB OUTSIDE AN ENCOUNTER (OUTPATIENT)
Dept: URBAN - METROPOLITAN AREA CLINIC 60 | Facility: CLINIC | Age: 44
End: 2024-01-03

## 2024-01-08 ENCOUNTER — TELEPHONE ENCOUNTER (OUTPATIENT)
Dept: URBAN - METROPOLITAN AREA CLINIC 63 | Facility: CLINIC | Age: 44
End: 2024-01-08

## 2024-01-09 ENCOUNTER — TELEPHONE ENCOUNTER (OUTPATIENT)
Dept: URBAN - METROPOLITAN AREA CLINIC 63 | Facility: CLINIC | Age: 44
End: 2024-01-09

## 2024-01-09 RX ORDER — UPADACITINIB 30 MG/1
1 TABLET TABLET, EXTENDED RELEASE ORAL ONCE A DAY
Qty: 30 | Refills: 5
Start: 2023-12-27 | End: 2024-07-07

## 2024-02-08 ENCOUNTER — COLON (OUTPATIENT)
Dept: URBAN - METROPOLITAN AREA SURGERY CENTER 4 | Facility: SURGERY CENTER | Age: 44
End: 2024-02-08
Payer: SELF-PAY

## 2024-02-08 DIAGNOSIS — K51.00 ULCERATIVE PANCOLITIS: ICD-10-CM

## 2024-02-08 DIAGNOSIS — K64.1 SECOND DEGREE HEMORRHOIDS: ICD-10-CM

## 2024-02-08 DIAGNOSIS — K63.89 OTHER SPECIFIED DISEASES OF INTESTINE: ICD-10-CM

## 2024-02-08 PROCEDURE — 45380 COLONOSCOPY AND BIOPSY: CPT | Performed by: INTERNAL MEDICINE

## 2024-02-08 RX ORDER — UPADACITINIB 30 MG/1
1 TABLET TABLET, EXTENDED RELEASE ORAL ONCE A DAY
Qty: 30 | Refills: 5 | Status: ACTIVE | COMMUNITY
Start: 2023-12-27 | End: 2024-07-07

## 2024-02-08 RX ORDER — MESALAMINE 1.2 G/1
TAKE 4 TABLETS BY MOUTH EVERY DAY TABLET, DELAYED RELEASE ORAL
Qty: 120 TABLET | Refills: 2 | Status: ACTIVE | COMMUNITY

## 2024-02-08 RX ORDER — UPADACITINIB 45 MG/1
AS DIRECTED TABLET, EXTENDED RELEASE ORAL
Qty: 60 | Refills: 0 | Status: ACTIVE | COMMUNITY

## 2024-02-08 RX ORDER — ROSUVASTATIN CALCIUM 20 MG/1
TABLET, FILM COATED ORAL
Qty: 90 TABLET | Refills: 0 | Status: ACTIVE | COMMUNITY

## 2024-02-08 RX ORDER — CALCIUM CARBONATE/VITAMIN D3 600 MG-10
1 CAPSULE TABLET ORAL ONCE A DAY
Status: ACTIVE | COMMUNITY

## 2024-02-22 ENCOUNTER — OV EP (OUTPATIENT)
Dept: URBAN - METROPOLITAN AREA CLINIC 60 | Facility: CLINIC | Age: 44
End: 2024-02-22
Payer: SELF-PAY

## 2024-02-22 VITALS
RESPIRATION RATE: 20 BRPM | OXYGEN SATURATION: 98 % | HEART RATE: 72 BPM | TEMPERATURE: 98.3 F | WEIGHT: 222 LBS | BODY MASS INDEX: 32.88 KG/M2 | SYSTOLIC BLOOD PRESSURE: 120 MMHG | DIASTOLIC BLOOD PRESSURE: 78 MMHG | HEIGHT: 69 IN

## 2024-02-22 DIAGNOSIS — K51.018 ULCERATIVE PANCOLITIS WITH OTHER COMPLICATION: ICD-10-CM

## 2024-02-22 DIAGNOSIS — K64.1 GRADE II HEMORRHOIDS: ICD-10-CM

## 2024-02-22 PROCEDURE — 99214 OFFICE O/P EST MOD 30 MIN: CPT | Performed by: NURSE PRACTITIONER

## 2024-02-22 RX ORDER — UPADACITINIB 45 MG/1
AS DIRECTED TABLET, EXTENDED RELEASE ORAL
Qty: 60 | Refills: 0 | Status: ACTIVE | COMMUNITY

## 2024-02-22 RX ORDER — ROSUVASTATIN CALCIUM 20 MG/1
TABLET, FILM COATED ORAL
Qty: 90 TABLET | Refills: 0 | Status: ACTIVE | COMMUNITY

## 2024-02-22 RX ORDER — UPADACITINIB 30 MG/1
1 TABLET TABLET, EXTENDED RELEASE ORAL ONCE A DAY
Qty: 30 | Refills: 5 | Status: ACTIVE | COMMUNITY
Start: 2023-12-27 | End: 2024-07-07

## 2024-02-22 RX ORDER — UPADACITINIB 30 MG/1
1 TABLET TABLET, EXTENDED RELEASE ORAL ONCE A DAY
Qty: 30 | Refills: 5

## 2024-02-22 RX ORDER — MESALAMINE 1.2 G/1
TAKE 4 TABLETS BY MOUTH EVERY DAY TABLET, DELAYED RELEASE ORAL
Qty: 120 TABLET | Refills: 2 | Status: ACTIVE | COMMUNITY

## 2024-02-22 RX ORDER — CALCIUM CARBONATE/VITAMIN D3 600 MG-10
1 CAPSULE TABLET ORAL ONCE A DAY
Status: ACTIVE | COMMUNITY

## 2024-02-22 NOTE — HPI-TODAY'S VISIT:
5/17 Patient comes from [facility] with diagnosis of UC in 2016 with chronic diarrhea and rectal bleeding, had a colonoscopy with evidence of pancolitis without lesion of the ileum,  Patient on prednisone 10 mg after high dose titration, and taking also Azulfidine 1 gr  TID. Patient will need Labs and will check CRP and ESR, will check LFT. And CBC. Patient is anemic and on treatment with iron. Will need colonoscopy, and will do IBD panel if is needed. Will try to wean him off prednisone and will continue with same treatment for now but will consider Remicade/Humira. Will check hepatitis panel, was checked by TB. Will follow closely. Discussion [Use for free text]. Discussed the need for appropriate follow-up care.  Patient will be placed in our recall system and a letter will be mailed to the patient.   6/17 Patient with UC, continue with multiples small BM with mucus and blood, no changes with the decreasing of steroids doses. Patient anemic with deficit of folate and Iron, probably as a side effect of use of Azulfidine. Labs Increased CPR 4.5 Normal hepatitis panel and liver enzymes.  Plan Will DC Azulfidine.  Patient will start with Apriso. Continue with 2.5 mg of prednisone for now. Start with folic acid 5 mg max dose for now and continue with PO Iron.  Colonoscopy upon clinic status patient may will need treatment with Humira / Remicade.  8/17 Patient is feeling better, BM frequency are decreased, no hematochezia or mucus. Denies abdominal pain. Patient will continue treatment with folate and Iron, and with Lialda and will have colonoscopy. Will do labs for folate and Iron next visit.  9/17 Patient is feeling better, gaining weight, more energy and general state, no BM during the night, 3-4 BM at day with no blood or mucus. Patient will do Lialda maintenance dose of 2.4 g at day. Colonoscopy with evidence of acute on chronic colon mucosa inflammation,, friable, vascular, pseudopolyps aspect of the colon mucosa, with normal mucosa of the terminal ileum  Patient will do Lialda maintenance dose of 2.4 g at day. Will do labs in next visit.  1/19 Patient here today in good general state, he said recently has a flare of his UC, because he had not insurance or not medication. He had resumed his treatment this month and is doing better but still more than 6 time in 24 hrs, no blood, or mucus, not abdominal pain. Also, complaint of symptoms of gastritis. Will plan colonoscopy and EGD upon clinical state and labs results. Patient may need treatment with biologic. Plan Patient will increase Lialda to 4.8 mg at day will start PPI. 3/19 Patient here today in good general state, he said has not more diarrhea or rectal bleeding regular bleeding. Stool studies are negative, anemia 11.1. CRP and ESR are normal. Patient will continue with Lialda, we will consider colonoscopy in 5 months. Will start treatment with Iron. 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  Patient said has been doing better this lasts couple of month, he had just 1 event of rectal bleeding a month ago, he said is feeling better has more energy and no anemia.  Patient had a colonoscopy done 2 years ago. Plan: Patient will have laboratories to check renal function, H&H, and we have surveillance colonoscopy. Continue with Lialda.  9/19 Patient here today in good general state, patient denies any GI symptoms, no rectal bleeding, no abdominal pain, no mucus discharge.  Laboratories he is here for his colonoscopy results he was found having 4 mm adenoma polyps into the transverse colon, internal hemorrhoids active vascular, congested, erythematous, friable, pseudopolyps mucosa in the entire examined colon.  Patient with medical history of ulcerative colitis.  Patient hemoglobin improved to 14.7.  Normal platelets, normal WBC, and RBC, normal liver enzymes, normal renal function, and blood sugar.  Levels of B12 and folate are normal. Plan: We will refer him with Dr.Jennifer Wise to have a chrome endoscopy to rule out cancer or pre-cancer lesions.  Patient will continue with Lialda 1.2 gm 2 tablets twice a day. 11/19 Patient here today in good general state he denies any abdominal pain, diarrhea, rectal bleeding.  Patient has an appointment in [place] to be seen by Dr. Odell due to colonoscopy positive for adenoma in the seating of an Ulcerative Colitis, since Dr. Dalia Alicea is not doing this procedure in [place] anymore. Patient will continue treatment with Lialda. 2/20 Patient here today in good general state, he said is a symptomatic, has normal bowel movements, no rectal bleeding, no mucus discharge, no abdominal pain.  Patient did visit the [facility] specialist, he was recommended to start on Remicade, by patient report he's going to start with Remicade on a month.  We will obtain records from [place].  Patient did not have the special colonoscopy done yet, he was advised to have several treatments of Remicade before he had a repeated colonoscopy. 4/20 Patient seems to be in good general state today, patient has medical history of severe ulcerative colitis with adenoma polyps of the colon.  He had no GI complaints, no abdominal pain, diarrhea, rectal bleeding, mucus discharge, fever, cough, sore throat.  He said he did not start with Remicade treatment because of the COVID-19 pandemia, he is afraid that his immune system get affected in the grade that he can acquire the virus.  Patient is taking Lialda at this time, doing diet, and is in self-quarantine at home. Patient will continue in self-isolation for now and will continue with Lialda until start with Remicade. 11/20 Patient here today in good general state, he said is feeling well, he completed his fourth doses of Remicade.  He said still using the Lialda 1.2 g bid.  Because when he stopped Lialda he started having diarrhea again.  But he also said he noticed the dual treatment with Remicade and Lialda work good for him. Plan: Patient will continue with this treatment.  And patient will have laboratories below to check on Remicade serum levels and drug antibody. Patient will need to visit his Dr in [place] in regard of his colonoscopy, since he had 4 doses of Remicade already to decide if he should continue with this treatment based on his pathologic and laboratory results or not. 1/21 Patient here today in good general state, patient laboratories shows evidence of: CBC with normal hemoglobin, WBC, and RBC,  CMP: Liver function, bilirubin, proteins, kidney function, and glucose are normal.  Remicade levels are normal, Remicade antibodies slightly elevated 22. Patient here today in good general state, he has not GI complaints today.  He said have no abdominal pain, diarrhea, rectal bleeding or any other GI symptoms.  Patient did not visit Dr. Odell in [facility] yet for his colonoscopy, he said he is planning to see her next month. Patient will continue with treatment with Lialda and Remicade, we will check antibodies and level of the Remicade in 4 months. 1/22 Patient here today in good general state.  He had no GI complaints.  Denies any diarrhea, rectal discharge, abdominal pain, or rectal bleeding.  Laboratories CBC was normal CMP shows evidence of elevated creatinine and low glomerular filtration rate.  Patient liver enzymes are normal.  Remicade antibodies are elevated more than 100 medication level is 4.0. Plan: We will repeat laboratory to check on liver function.  And also will repeat laboratories for increasing mid back level [email or web address]   Patient is a symptomatic but, we may increase the frequency of the Remicade infusions upon patient laboratories to decrease the chance of flares. Patient has medical history of large tubular adenoma polyps 2 years ago we will plan colonoscopy in his next office visit.  2/22 Patient here today in good general state.  He denies any GI symptoms, abdominal pain, diarrhea, rectal bleeding or any sign of ulcerative colitis flare. Laboratories done at [facility] shows evidence of none detectable serum Infliximab less than 1.0 and detectable antibodies to infliximab 5.0. 6/22 Colonoscopy report demonstrate two 12 to 15 mm polyps into the mid descending colon, removed.  A diminutive polyp into the mid ascending colon, removed.  Pseudopolyps in the entire examined colon.  Inflamed mucosa in the rectum, in the descending colon, at the splenic flexure, in the transverse colon, at the hepatic flexure, in the ascending and in the cecum/ biopsied.  The examined portion of the ileum was normal.  No bleeding internal hemorrhoids. Biopsy results shows evidence of terminal ileum biopsy with a small bowel mucosa with mild chronic enteritis.  Right colon biopsy chronic colitis.  Ascending colon polypectomy: Inflammatory pseudopolyp.  Left colon biopsy: Chronic active colitis with isolated cryptitis.  Mid descending colon polypectomy: Chronic active colitis with ulcerations.  Sigmoid colon biopsy: Chronic colitis with distortion, no cryptitis.  Rectum biopsy: Chronic active colitis.  Today patient is in good general state he has no GI complaints he denies any rectal bleeding, rectal discharge, diarrhea, constipation, abdominal pain or any other GI symptoms. Laboratories done on June 14, 2022, demonstrate lipid panel with slightly elevated triglyceride 212, LDL cholesterol 105, non-HDL cholesterol 138.  CMP glomerular filtration rate 80, creatinine 1.15 normal, normal liver enzymes and blood sugar.  CBC normal, TSH normal. 8/22  Patient here today in good general state. He denies any rectal bleeding, abdominal pain, diarrhea, or constipation, bowel movements around 3-4 a day. Some mucus discharge on and off. Patient states he is feeling fairly well. We have a lengthy conversation with him about his colon progressing ulcerative colitis condition demonstrated by her last colonoscopy/ biopsy results, and possible future complications as severe flares, cancer, colon resection.  Patient has been in Remicade and mesalamine through the last 2-year, with no improvement of the colonic mucosa, with findings of tubular adenoma polyps, pseudopolyps, and extensive ulcerative process throughout the colon. Patient laboratories showed adequate levels of Infliximab the day before of the Remicade infusion and 3 weeks after the infusion with no antibodies present, despite these laboratory results and based his colonoscopy/pathology results we think this treatment is not working for him.  So, we think it is better for this patient to stop Remicade infusion and mesalamine and start treatment with Rinvoq 45 mg daily for 8 weeks and upon patient clinical course will decrease to 35 mg daily maintenance dose.  9/22 Patient here today in good general state he is started on Rinvoq 45 mg daily 5 weeks ago.  Patient noted improvement on his bowel movements in general state.  He will continue with 45 mg of Rinvoq for 3 more weeks and will decrease it to 35 mg as maintenance doses. 12/22 Patient is here today in good general state, he denies any rectal bleeding, diarrhea, abdominal pain or any other GI symptoms he is having 1 or 2 bowel movements daily.  He is on 35 mg of Rinvoq daily. 11/23 Patient has medical history of ulcerative colitis.  Patient was started on Rinvoq and also continue on mesalamine, and he was doing good.  He said he's still doing good but, he is now at having green box anymore because he was lacking of medical insurance. Patient denies any rectal bleeding, abdominal pain, nausea, vomited, bowel movements are 2 or 3 a day, with no blood or mucus in it. Patient will resume treatment with Rinvoq 45 mg daily, continue.   12/23 Patient is here today in good general state he has no GI complaints, denying any abdominal pain, diarrhea, rectal discharge, rectal bleeding.  He is on Rinvoq 30 mg daily and mesalamine.laboratories shows evidence of negative hepatitis B, negative QuantiFERO -TB Gold, slightly elevated A 73, ST ALT.  Normal.  Normal kidney function, and normal CRP.   Surveillance colonoscopy. 2/24Patient here today in good general state he has no GI complaints today denies any abdominal pain, diarrhea, constipation, rectal bleeding, rectal discharge.  Colonoscopy shows evidence of internal hemorrhoids atrophic, erythematous, cervical polyps and vascular/pattern decreases mucosa in the entire examined colon.  Overall the colon looks better than last colonoscopy.  Patient next colon recall will be in 2 years.  Continue with Rinvoq.  Patient was advised to have vaccination against shingles.

## 2024-06-20 ENCOUNTER — OFFICE VISIT (OUTPATIENT)
Dept: URBAN - METROPOLITAN AREA CLINIC 60 | Facility: CLINIC | Age: 44
End: 2024-06-20
Payer: COMMERCIAL

## 2024-06-20 ENCOUNTER — DASHBOARD ENCOUNTERS (OUTPATIENT)
Age: 44
End: 2024-06-20

## 2024-06-20 VITALS
BODY MASS INDEX: 34.07 KG/M2 | HEIGHT: 69 IN | OXYGEN SATURATION: 98 % | HEART RATE: 94 BPM | WEIGHT: 230 LBS | TEMPERATURE: 97.9 F | DIASTOLIC BLOOD PRESSURE: 86 MMHG | SYSTOLIC BLOOD PRESSURE: 126 MMHG

## 2024-06-20 DIAGNOSIS — K51.00 ULCERATIVE PANCOLITIS WITHOUT COMPLICATION: ICD-10-CM

## 2024-06-20 DIAGNOSIS — K64.1 GRADE II HEMORRHOIDS: ICD-10-CM

## 2024-06-20 PROCEDURE — 99214 OFFICE O/P EST MOD 30 MIN: CPT | Performed by: NURSE PRACTITIONER

## 2024-06-20 RX ORDER — MESALAMINE 1.2 G/1
TAKE 4 TABLETS BY MOUTH EVERY DAY TABLET, DELAYED RELEASE ORAL ONCE A DAY
Qty: 360 TABLETS | Refills: 3 | Status: ACTIVE | COMMUNITY

## 2024-06-20 RX ORDER — UPADACITINIB 30 MG/1
1 TABLET TABLET, EXTENDED RELEASE ORAL ONCE A DAY
Qty: 30 | Refills: 5 | Status: ACTIVE | COMMUNITY

## 2024-06-20 RX ORDER — CALCIUM CARBONATE/VITAMIN D3 600 MG-10
1 CAPSULE TABLET ORAL ONCE A DAY
Status: ACTIVE | COMMUNITY

## 2024-06-20 RX ORDER — ROSUVASTATIN CALCIUM 20 MG/1
TABLET, FILM COATED ORAL
Qty: 90 TABLET | Refills: 0 | Status: ACTIVE | COMMUNITY

## 2024-06-20 RX ORDER — UPADACITINIB 30 MG/1
1 TABLET TABLET, EXTENDED RELEASE ORAL ONCE A DAY
Qty: 30 | Refills: 5

## 2024-06-20 RX ORDER — MESALAMINE 1.2 G/1
2 TABLETS WITH A MEAL TABLET, DELAYED RELEASE ORAL ONCE A DAY
Qty: 180 TABLET | Refills: 0 | OUTPATIENT
Start: 2024-06-20 | End: 2024-09-18

## 2024-06-20 NOTE — HPI-TODAY'S VISIT:
5/17 Patient comes from [facility] with diagnosis of UC in 2016 with chronic diarrhea and rectal bleeding, had a colonoscopy with evidence of pancolitis without lesion of the ileum,  Patient on prednisone 10 mg after high dose titration, and taking also Azulfidine 1 gr  TID. Patient will need Labs and will check CRP and ESR, will check LFT. And CBC. Patient is anemic and on treatment with iron. Will need colonoscopy, and will do IBD panel if is needed. Will try to wean him off prednisone and will continue with same treatment for now but will consider Remicade/Humira. Will check hepatitis panel, was checked by TB. Will follow closely. Discussion [Use for free text]. Discussed the need for appropriate follow-up care.  Patient will be placed in our recall system and a letter will be mailed to the patient.   6/17 Patient with UC, continue with multiples small BM with mucus and blood, no changes with the decreasing of steroids doses. Patient anemic with deficit of folate and Iron, probably as a side effect of use of Azulfidine. Labs Increased CPR 4.5 Normal hepatitis panel and liver enzymes.  Plan Will DC Azulfidine.  Patient will start with Apriso. Continue with 2.5 mg of prednisone for now. Start with folic acid 5 mg max dose for now and continue with PO Iron.  Colonoscopy upon clinic status patient may will need treatment with Humira / Remicade.  8/17 Patient is feeling better, BM frequency are decreased, no hematochezia or mucus. Denies abdominal pain. Patient will continue treatment with folate and Iron, and with Lialda and will have colonoscopy. Will do labs for folate and Iron next visit.  9/17 Patient is feeling better, gaining weight, more energy and general state, no BM during the night, 3-4 BM at day with no blood or mucus. Patient will do Lialda maintenance dose of 2.4 g at day. Colonoscopy with evidence of acute on chronic colon mucosa inflammation, friable, vascular, pseudopolyps aspect of the colon mucosa, with normal mucosa of the terminal ileum  Patient will do Lialda maintenance dose of 2.4 g at day. Will do labs in next visit.  1/19 Patient here today in good general state, he said recently has a flare of his UC, because he had no insurance or not medication. He had resumed his treatment this month and is doing better but still more than 6 time in 24 hrs, no blood, or mucus, not abdominal pain. Also, complaint of symptoms of gastritis. Will plan colonoscopy and EGD upon clinical state and labs results. Patient may need treatment with biologic. Plan Patient will increase Lialda to 4.8 mg at day will start PPI. 3/19 Patient here today in good general state, he said has not more diarrhea or rectal bleeding regular bleeding. Stool studies are negative, anemia 11.1. CRP and ESR are normal. Patient will continue with Lialda, we will consider colonoscopy in 5 months. Will start treatment with Iron. 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  Patient said has been doing better this lasts couple of month, he had just 1 event of rectal bleeding a month ago, he said is feeling better has more energy and no anemia.  Patient had a colonoscopy done 2 years ago. Plan: Patient will have laboratories to check renal function, H&H, and we have surveillance colonoscopy. Continue with Lialda.  9/19 Patient here today in good general state, patient denies any GI symptoms, no rectal bleeding, no abdominal pain, no mucus discharge.  Laboratories he is here for his colonoscopy results he was found having 4 mm adenoma polyps into the transverse colon, internal hemorrhoids active vascular, congested, erythematous, friable, pseudopolyps mucosa in the entire examined colon.  Patient with medical history of ulcerative colitis.  Patient hemoglobin improved to 14.7.  Normal platelets, normal WBC, and RBC, normal liver enzymes, normal renal function, and blood sugar.  Levels of B12 and folate are normal. Plan: We will refer him with Dr.Jennifer Wise to have a chrome endoscopy to rule out cancer or pre-cancer lesions.  Patient will continue with Lialda 1.2 gm 2 tablets twice a day. 11/19 Patient here today in good general state he denies any abdominal pain, diarrhea, rectal bleeding.  Patient has an appointment in [place] to be seen by Dr. Odell due to colonoscopy positive for adenoma in the seating of an Ulcerative Colitis, since Dr. Dalia Alicea is not doing this procedure in [place] anymore. Patient will continue treatment with Lialda. 2/20 Patient here today in good general state, he said is a symptomatic, has normal bowel movements, no rectal bleeding, no mucus discharge, no abdominal pain.  Patient did visit the [facility] specialist, he was recommended to start on Remicade, by patient report he's going to start with Remicade on a month.  We will obtain records from [place].  Patient did not have the special colonoscopy done yet, he was advised to have several treatments of Remicade before he had a repeated colonoscopy. 4/20 Patient seems to be in good general state today, patient has medical history of severe ulcerative colitis with adenoma polyps of the colon.  He had no GI complaints, no abdominal pain, diarrhea, rectal bleeding, mucus discharge, fever, cough, sore throat.  He said he did not start with Remicade treatment because of the COVID-19 pandemia, he is afraid that his immune system get affected in the grade that he can acquire the virus.  Patient is taking Lialda at this time, doing diet, and is in self-quarantine at home. Patient will continue in self-isolation for now and will continue with Lialda until start with Remicade. 11/20 Patient here today in good general state, he said is feeling well, he completed his fourth doses of Remicade.  He said still using the Lialda 1.2 g bid.  Because when he stopped Lialda he started having diarrhea again.  But he also said he noticed the dual treatment with Remicade and Lialda work good for him. Plan: Patient will continue with this treatment.  And patient will have laboratories below to check on Remicade serum levels and drug antibody. Patient will need to visit his Dr in [place] in regard of his colonoscopy, since he had 4 doses of Remicade already to decide if he should continue with this treatment based on his pathologic and laboratory results or not. 1/21 Patient here today in good general state, patient laboratories shows evidence of: CBC with normal hemoglobin, WBC, and RBC,  CMP: Liver function, bilirubin, proteins, kidney function, and glucose are normal.  Remicade levels are normal, Remicade antibodies slightly elevated 22. Patient here today in good general state, he has no GI complaints today.  He said have no abdominal pain, diarrhea, rectal bleeding or any other GI symptoms.  Patient did not visit Dr. Odell in [facility] yet for his colonoscopy, he said he is planning to see her next month. Patient will continue with treatment with Lialda and Remicade, we will check antibodies and level of the Remicade in 4 months. 1/22 Patient here today in good general state.  He had no GI complaints.  Denies any diarrhea, rectal discharge, abdominal pain, or rectal bleeding.  Laboratories CBC was normal CMP shows evidence of elevated creatinine and low glomerular filtration rate.  Patient liver enzymes are normal.  Remicade antibodies are elevated more than 100 medication level is 4.0. Plan: We will repeat laboratory to check on liver function.  And also will repeat laboratories for increasing mid back level and [facility].   Patient is a symptomatic but, we may increase the frequency of the Remicade infusions upon patient laboratories to decrease the chance of flares. Patient has medical history of large tubular adenoma polyps 2 years ago we will plan colonoscopy in his next office visit.  2/22 Patient here today in good general state.  He denies any GI symptoms, abdominal pain, diarrhea, rectal bleeding or any sign of ulcerative colitis flare. Laboratories done at [facility] shows evidence of none detectable serum Infliximab less than 1.0 and detectable antibodies to infliximab 5.0. 6/22 Colonoscopy report demonstrate two 12 to 15 mm polyps into the mid descending colon, removed.  A diminutive polyp into the mid-ascending colon, removed.  Pseudopolyps in the entire examined colon.  Inflamed mucosa in the rectum, in the descending colon, at the splenic flexure, in the transverse colon, at the hepatic flexure, in the ascending and in the cecum/ biopsied.  The examined portion of the ileum was normal.  No bleeding internal hemorrhoids. Biopsy results show evidence of terminal ileum biopsy with a small bowel mucosa with mild chronic enteritis.  Right colon biopsy chronic colitis.  Ascending colon polypectomy: Inflammatory pseudopolyp.  Left colon biopsy: Chronic active colitis with isolated cryptitis.  Mid descending colon polypectomy: Chronic active colitis with ulcerations.  Sigmoid colon biopsy: Chronic colitis with distortion, no cryptitis.  Rectum biopsy: Chronic active colitis.  Today patient is in good general state he has no GI complaints he denies any rectal bleeding, rectal discharge, diarrhea, constipation, abdominal pain or any other GI symptoms. Laboratories done on June 14, 2022, demonstrate lipid panel with slightly elevated triglyceride 212, LDL cholesterol 105, non-HDL cholesterol 138.  CMP glomerular filtration rate 80, creatinine 1.15 normal, normal liver enzymes and blood sugar.  CBC normal, TSH normal. 8/22  Patient here today in good general state. He denies any rectal bleeding, abdominal pain, diarrhea, or constipation, bowel movements around 3-4 a day. Some mucus discharge on and off. Patient states he is feeling fairly well. We have a lengthy conversation with him about his colon progressing ulcerative colitis condition demonstrated by her last colonoscopy/ biopsy results, and possible future complications as severe flares, cancer, colon resection.  Patient has been in Remicade and mesalamine through the last 2-year, with no improvement of the colonic mucosa, with findings of tubular adenoma polyps, pseudopolyps, and extensive ulcerative process throughout the colon. Patient laboratories showed adequate levels of Infliximab the day before of the Remicade infusion and 3 weeks after the infusion with no antibodies present, despite these laboratory results and based his colonoscopy/pathology results we think this treatment is not working for him.  So, we think it is better for this patient to stop Remicade infusion and mesalamine and start treatment with Rinvoq 45 mg daily for 8 weeks and upon patient clinical course will decrease to 35 mg daily maintenance dose.  9/22 Patient here today in good general state he is started on Rinvoq 45 mg daily 5 weeks ago.  Patient noted improvement on his bowel movements in general state.  He will continue with 45 mg of Rinvoq for 3 more weeks and will decrease it to 35 mg as maintenance doses. 12/22 Patient is here today in good general state, he denies any rectal bleeding, diarrhea, abdominal pain or any other GI symptoms he is having 1 or 2 bowel movements daily.  He is on 35 mg of Rinvoq daily. 11/23 Patient has medical history of ulcerative colitis.  Patient was started on Rinvoq and also continue on mesalamine, and he was doing good.  He said he's still doing good but, he is now at having green box anymore because he was lacking of medical insurance. Patient denies any rectal bleeding, abdominal pain, nausea, vomited, bowel movements are 2 or 3 a day, with no blood or mucus in it. Patient will resume treatment with Rinvoq 45 mg daily, continue.   12/23 Patient is here today in good general state he has no GI complaints, denying any abdominal pain, diarrhea, rectal discharge, rectal bleeding.  He is on Rinvoq 30 mg daily and mesalamine.Laboratories shows evidence of negative hepatitis B, negative QuantiFERO -TB Gold, slightly elevated A 73, ST ALT.  Normal.  Normal kidney function, and normal CRP.   Surveillance colonoscopy. 2/24Patient here today in good general state he has no GI complaints today denies any abdominal pain, diarrhea, constipation, rectal bleeding, rectal discharge.  Colonoscopy shows evidence of internal hemorrhoids atrophic, erythematous, cervical polyps and vascular/pattern decreases mucosa in the entire examined colon.  Overall the colon looks better than last colonoscopy.  Patient next colon recall will be in 2 years.  Continue with Rinvoq.  Patient was advised to have vaccination against shingles.  6/24 Patient here today in good general state.  He is here for his close oncologic follow-up.  Patient on Rinvoq and mesalamine doing well, denies any GI symptoms.  No abdominal pain or rectal bleeding.  Patient will have laboratory to check on kidneys and liver function, CBC, TB Gold, hepatitis B.

## 2024-08-20 ENCOUNTER — OFFICE VISIT (OUTPATIENT)
Dept: URBAN - METROPOLITAN AREA CLINIC 60 | Facility: CLINIC | Age: 44
End: 2024-08-20

## 2024-10-02 ENCOUNTER — ERX REFILL RESPONSE (OUTPATIENT)
Dept: URBAN - METROPOLITAN AREA CLINIC 60 | Facility: CLINIC | Age: 44
End: 2024-10-02

## 2024-10-02 RX ORDER — MESALAMINE 1.2 G/1
TAKE 2 TABLETS BY MOUTH DAILY WITH A MEAL TABLET, DELAYED RELEASE ORAL
Qty: 180 TABLET | Refills: 0 | OUTPATIENT

## 2024-10-02 RX ORDER — MESALAMINE 1.2 G/1
TAKE 4 TABLETS BY MOUTH EVERY DAY TABLET, DELAYED RELEASE ORAL ONCE A DAY
Qty: 360 TABLETS | Refills: 3 | OUTPATIENT

## 2024-12-06 ENCOUNTER — TELEPHONE ENCOUNTER (OUTPATIENT)
Dept: URBAN - METROPOLITAN AREA CLINIC 63 | Facility: CLINIC | Age: 44
End: 2024-12-06

## 2024-12-06 RX ORDER — UPADACITINIB 30 MG/1
1 TABLET TABLET, EXTENDED RELEASE ORAL ONCE A DAY
Qty: 90 TABLET | Refills: 5

## 2025-02-03 ENCOUNTER — TELEPHONE ENCOUNTER (OUTPATIENT)
Dept: URBAN - METROPOLITAN AREA CLINIC 63 | Facility: CLINIC | Age: 45
End: 2025-02-03

## 2025-02-03 NOTE — PHYSICAL EXAM SKIN:
Last Appointment:  1/22/2025  Future Appointments   Date Time Provider Department Center   2/17/2025  7:00 PM [facility] CT RM 3 [id number] CT [facility]   3/26/2025  3:15 PM Srinath Tolbert MD [facility]   8/26/2025 10:00 AM Srinath Tolbert MD [facility]       No rashes, no jaundice present , good turgor , no masses.

## 2025-02-05 ENCOUNTER — OFFICE VISIT (OUTPATIENT)
Dept: URBAN - METROPOLITAN AREA CLINIC 60 | Facility: CLINIC | Age: 45
End: 2025-02-05

## 2025-02-05 RX ORDER — MESALAMINE 1.2 G/1
TAKE 2 TABLETS BY MOUTH DAILY WITH A MEAL TABLET, DELAYED RELEASE ORAL
Qty: 180 TABLET | Refills: 0 | Status: ACTIVE | COMMUNITY

## 2025-02-05 RX ORDER — CALCIUM CARBONATE/VITAMIN D3 600 MG-10
1 CAPSULE TABLET ORAL ONCE A DAY
Status: ACTIVE | COMMUNITY

## 2025-02-05 RX ORDER — UPADACITINIB 30 MG/1
1 TABLET TABLET, EXTENDED RELEASE ORAL ONCE A DAY
Qty: 90 TABLET | Refills: 5 | Status: ACTIVE | COMMUNITY

## 2025-02-05 RX ORDER — ROSUVASTATIN CALCIUM 20 MG/1
TABLET, FILM COATED ORAL
Qty: 90 TABLET | Refills: 0 | Status: ACTIVE | COMMUNITY

## 2025-02-09 LAB
A/G RATIO: 1.6
ALBUMIN: 4.7
ALKALINE PHOSPHATASE: 74
ALT (SGPT): 64
AST (SGOT): 32
BILIRUBIN, TOTAL: 0.4
BUN/CREATININE RATIO: (no result)
BUN: 13
C-REACTIVE PROTEIN, QUANT: <3
CALCIUM: 10.3
CARBON DIOXIDE, TOTAL: 28
CHLORIDE: 105
CREATININE: 1.13
EGFR: 83
FOLATE (FOLIC ACID), SERUM: 10.5
GLOBULIN, TOTAL: 3
GLUCOSE: 84
HEMATOCRIT: 43.7
HEMOGLOBIN: 14
HEP B CORE AB, IGM: (no result)
HEPATITIS B SURFACE ANTIGEN: (no result)
MCH: 29.3
MCHC: 32
MCV: 91.4
MITOGEN-NIL: 7.42
MPV: 9.1
PLATELET COUNT: 333
POTASSIUM: 4.4
PROTEIN, TOTAL: 7.7
QUANTIFERON NIL VALUE: 0.02
QUANTIFERON TB1 AG VALUE: 0.01
QUANTIFERON TB2 AG VALUE: 0.02
QUANTIFERON-TB GOLD PLUS: NEGATIVE
RDW: 14.5
RED BLOOD CELL COUNT: 4.78
SODIUM: 140
VITAMIN B12: 351
WHITE BLOOD CELL COUNT: 5.5

## 2025-02-18 ENCOUNTER — LAB OUTSIDE AN ENCOUNTER (OUTPATIENT)
Dept: URBAN - METROPOLITAN AREA CLINIC 63 | Facility: CLINIC | Age: 45
End: 2025-02-18

## 2025-02-24 LAB
CALPROTECTIN, FECAL: 34
LACTOFERRIN, FECAL, QUANT.: <6.25

## 2025-02-27 ENCOUNTER — OFFICE VISIT (OUTPATIENT)
Dept: URBAN - METROPOLITAN AREA CLINIC 60 | Facility: CLINIC | Age: 45
End: 2025-02-27

## 2025-02-27 VITALS
SYSTOLIC BLOOD PRESSURE: 120 MMHG | HEART RATE: 110 BPM | RESPIRATION RATE: 20 BRPM | HEIGHT: 69 IN | OXYGEN SATURATION: 98 % | WEIGHT: 242 LBS | BODY MASS INDEX: 35.84 KG/M2 | DIASTOLIC BLOOD PRESSURE: 80 MMHG | TEMPERATURE: 97.9 F

## 2025-02-27 RX ORDER — MESALAMINE 1.2 G/1
TAKE 2 TABLETS BY MOUTH DAILY WITH A MEAL TABLET, DELAYED RELEASE ORAL
Qty: 180 TABLET | Refills: 0 | Status: ACTIVE | COMMUNITY

## 2025-02-27 RX ORDER — CALCIUM CARBONATE/VITAMIN D3 600 MG-10
1 CAPSULE TABLET ORAL ONCE A DAY
Status: ACTIVE | COMMUNITY

## 2025-02-27 RX ORDER — ROSUVASTATIN CALCIUM 20 MG/1
TABLET, FILM COATED ORAL
Qty: 90 TABLET | Refills: 0 | Status: ACTIVE | COMMUNITY

## 2025-02-27 RX ORDER — UPADACITINIB 30 MG/1
1 TABLET TABLET, EXTENDED RELEASE ORAL ONCE A DAY
Qty: 90 TABLET | Refills: 5 | Status: ACTIVE | COMMUNITY

## 2025-02-27 NOTE — HPI-TODAY'S VISIT:
5/17 Patient comes from [facility] with diagnosis of UC in 2016 with chronic diarrhea and rectal bleeding, had a colonoscopy with evidence of pancolitis without lesion of the ileum,  Patient on prednisone 10 mg after high dose titration, and taking also Azulfidine 1 gr  TID. Patient will need Labs and will check CRP and ESR, will check LFT. And CBC. Patient is anemic and on treatment with iron. Will need colonoscopy, and will do IBD panel if is needed. Will try to wean him off prednisone and will continue with same treatment for now but will consider Remicade/Humira. Will check hepatitis panel, was checked by TB. Will follow closely. Discussion [Use for free text]. Discussed the need for appropriate follow-up care.  Patient will be placed in our recall system and a letter will be mailed to the patient.   6/17 Patient with UC, continue with multiples small BM with mucus and blood, no changes with the decreasing of steroids doses. Patient anemic with deficit of folate and Iron, probably as a side effect of use of Azulfidine. Labs Increased CPR 4.5 Normal hepatitis panel and liver enzymes.  Plan Will DC Azulfidine.  Patient will start with Apriso. Continue with 2.5 mg of prednisone for now. Start with folic acid 5 mg max dose for now and continue with PO Iron.  Colonoscopy upon clinic status patient may will need treatment with Humira / Remicade.  8/17 Patient is feeling better, BM frequency are decreased, no hematochezia or mucus. Denies abdominal pain. Patient will continue treatment with folate and Iron, and with Lialda and will have colonoscopy. Will do labs for folate and Iron next visit.  9/17 Patient is feeling better, gaining weight, more energy and general state, no BM during the night, 3-4 BM at day with no blood or mucus. Patient will do Lialda maintenance dose of 2.4 g at day. Colonoscopy with evidence of acute on chronic colon mucosa inflammation, friable, vascular, pseudopolyps aspect of the colon mucosa, with normal mucosa of the terminal ileum  Patient will do Lialda maintenance dose of 2.4 g at day. Will do labs in next visit.  1/19 Patient here today in good general state, he said recently has a flare of his UC, because he had no insurance or not medication. He had resumed his treatment this month and is doing better but still more than 6 time in 24 hrs, no blood, or mucus, not abdominal pain. Also, complaint of symptoms of gastritis. Will plan colonoscopy and EGD upon clinical state and labs results. Patient may need treatment with biologic. Plan Patient will increase Lialda to 4.8 mg at day will start PPI. 3/19 Patient here today in good general state, he said has not more diarrhea or rectal bleeding regular bleeding. Stool studies are negative, anemia 11.1. CRP and ESR are normal. Patient will continue with Lialda, we will consider colonoscopy in 5 months. Will start treatment with Iron. 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  Patient said has been doing better this lasts couple of month, he had just 1 event of rectal bleeding a month ago, he said is feeling better has more energy and no anemia.  Patient had a colonoscopy done 2 years ago. Plan: Patient will have laboratories to check renal function, H&H, and we have surveillance colonoscopy. Continue with Lialda.  9/19 Patient here today in good general state, patient denies any GI symptoms, no rectal bleeding, no abdominal pain, no mucus discharge.  Laboratories he is here for his colonoscopy results he was found having 4 mm adenoma polyps into the transverse colon, internal hemorrhoids active vascular, congested, erythematous, friable, pseudopolyps mucosa in the entire examined colon.  Patient with medical history of ulcerative colitis.  Patient hemoglobin improved to 14.7.  Normal platelets, normal WBC, and RBC, normal liver enzymes, normal renal function, and blood sugar.  Levels of B12 and folate are normal. Plan: We will refer him with Dr.Jennifer Wise to have a chrome endoscopy to rule out cancer or pre-cancer lesions.  Patient will continue with Lialda 1.2 gm 2 tablets twice a day. 11/19 Patient here today in good general state he denies any abdominal pain, diarrhea, rectal bleeding.  Patient has an appointment in [place] to be seen by Dr. Odell due to colonoscopy positive for adenoma in the seating of an Ulcerative Colitis, since Dr. Dalia Alicea is not doing this procedure in [place] anymore. Patient will continue treatment with Lialda. 2/20 Patient here today in good general state, he said is a symptomatic, has normal bowel movements, no rectal bleeding, no mucus discharge, no abdominal pain.  Patient did visit the [facility] specialist, he was recommended to start on Remicade, by patient report he's going to start with Remicade on a month.  We will obtain records from [place].  Patient did not have the special colonoscopy done yet, he was advised to have several treatments of Remicade before he had a repeated colonoscopy. 4/20 Patient seems to be in good general state today, patient has medical history of severe ulcerative colitis with adenoma polyps of the colon.  He had no GI complaints, no abdominal pain, diarrhea, rectal bleeding, mucus discharge, fever, cough, sore throat.  He said he did not start with Remicade treatment because of the COVID-19 pandemia, he is afraid that his immune system get affected in the grade that he can acquire the virus.  Patient is taking Lialda at this time, doing diet, and is in self-quarantine at home. Patient will continue in self-isolation for now and will continue with Lialda until start with Remicade. 11/20 Patient here today in good general state, he said is feeling well, he completed his fourth doses of Remicade.  He said still using the Lialda 1.2 g bid.  Because when he stopped Lialda he started having diarrhea again.  But he also said he noticed the dual treatment with Remicade and Lialda work good for him. Plan: Patient will continue with this treatment.  And patient will have laboratories below to check on Remicade serum levels and drug antibody. Patient will need to visit his Dr in [place] in regard of his colonoscopy, since he had 4 doses of Remicade already to decide if he should continue with this treatment based on his pathologic and laboratory results or not. 1/21 Patient here today in good general state, patient laboratories shows evidence of: CBC with normal hemoglobin, WBC, and RBC,  CMP: Liver function, bilirubin, proteins, kidney function, and glucose are normal.  Remicade levels are normal, Remicade antibodies slightly elevated 22. Patient here today in good general state, he has no GI complaints today.  He said have no abdominal pain, diarrhea, rectal bleeding or any other GI symptoms.  Patient did not visit Dr. Odell in [facility] yet for his colonoscopy, he said he is planning to see her next month. Patient will continue with treatment with Lialda and Remicade, we will check antibodies and level of the Remicade in 4 months. 1/22 Patient here today in good general state.  He had no GI complaints.  Denies any diarrhea, rectal discharge, abdominal pain, or rectal bleeding.  Laboratories CBC was normal CMP shows evidence of elevated creatinine and low glomerular filtration rate.  Patient liver enzymes are normal.  Remicade antibodies are elevated more than 100 medication level is 4.0. Plan: We will repeat laboratory to check on liver function.  And also will repeat laboratories for increasing mid back level and [facility].   Patient is a symptomatic but, we may increase the frequency of the Remicade infusions upon patient laboratories to decrease the chance of flares. Patient has medical history of large tubular adenoma polyps 2 years ago we will plan colonoscopy in his next office visit.  2/22 Patient here today in good general state.  He denies any GI symptoms, abdominal pain, diarrhea, rectal bleeding or any sign of ulcerative colitis flare. Laboratories done at [facility] shows evidence of none detectable serum Infliximab less than 1.0 and detectable antibodies to infliximab 5.0. 6/22 Colonoscopy report demonstrate two 12 to 15 mm polyps into the mid descending colon, removed.  A diminutive polyp into the mid-ascending colon, removed.  Pseudopolyps in the entire examined colon.  Inflamed mucosa in the rectum, in the descending colon, at the splenic flexure, in the transverse colon, at the hepatic flexure, in the ascending and in the cecum/ biopsied.  The examined portion of the ileum was normal.  No bleeding internal hemorrhoids. Biopsy results show evidence of terminal ileum biopsy with a small bowel mucosa with mild chronic enteritis.  Right colon biopsy chronic colitis.  Ascending colon polypectomy: Inflammatory pseudopolyp.  Left colon biopsy: Chronic active colitis with isolated cryptitis.  Mid descending colon polypectomy: Chronic active colitis with ulcerations.  Sigmoid colon biopsy: Chronic colitis with distortion, no cryptitis.  Rectum biopsy: Chronic active colitis.  Today patient is in good general state he has no GI complaints he denies any rectal bleeding, rectal discharge, diarrhea, constipation, abdominal pain or any other GI symptoms. Laboratories done on June 14, 2022, demonstrate lipid panel with slightly elevated triglyceride 212, LDL cholesterol 105, non-HDL cholesterol 138.  CMP glomerular filtration rate 80, creatinine 1.15 normal, normal liver enzymes and blood sugar.  CBC normal, TSH normal. 8/22  Patient here today in good general state. He denies any rectal bleeding, abdominal pain, diarrhea, or constipation, bowel movements around 3-4 a day. Some mucus discharge on and off. Patient states he is feeling fairly well. We have a lengthy conversation with him about his colon progressing ulcerative colitis condition demonstrated by her last colonoscopy/ biopsy results, and possible future complications as severe flares, cancer, colon resection.  Patient has been in Remicade and mesalamine through the last 2-year, with no improvement of the colonic mucosa, with findings of tubular adenoma polyps, pseudopolyps, and extensive ulcerative process throughout the colon. Patient laboratories showed adequate levels of Infliximab the day before of the Remicade infusion and 3 weeks after the infusion with no antibodies present, despite these laboratory results and based his colonoscopy/pathology results we think this treatment is not working for him.  So, we think it is better for this patient to stop Remicade infusion and mesalamine and start treatment with Rinvoq 45 mg daily for 8 weeks and upon patient clinical course will decrease to 35 mg daily maintenance dose.  9/22 Patient here today in good general state he is started on Rinvoq 45 mg daily 5 weeks ago.  Patient noted improvement on his bowel movements in general state.  He will continue with 45 mg of Rinvoq for 3 more weeks and will decrease it to 35 mg as maintenance doses. 12/22 Patient is here today in good general state, he denies any rectal bleeding, diarrhea, abdominal pain or any other GI symptoms he is having 1 or 2 bowel movements daily.  He is on 35 mg of Rinvoq daily. 11/23 Patient has medical history of ulcerative colitis.  Patient was started on Rinvoq and also continue on mesalamine, and he was doing good.  He said he's still doing good but, he is now at having green box anymore because he was lacking of medical insurance. Patient denies any rectal bleeding, abdominal pain, nausea, vomited, bowel movements are 2 or 3 a day, with no blood or mucus in it. Patient will resume treatment with Rinvoq 45 mg daily, continue.   12/23 Patient is here today in good general state he has no GI complaints, denying any abdominal pain, diarrhea, rectal discharge, rectal bleeding.  He is on Rinvoq 30 mg daily and mesalamine.Laboratories shows evidence of negative hepatitis B, negative QuantiFERO -TB Gold, slightly elevated A 73, ST ALT.  Normal.  Normal kidney function, and normal CRP.   Surveillance colonoscopy. 2/24Patient here today in good general state he has no GI complaints today denies any abdominal pain, diarrhea, constipation, rectal bleeding, rectal discharge.  Colonoscopy shows evidence of internal hemorrhoids atrophic, erythematous, cervical polyps and vascular/pattern decreases mucosa in the entire examined colon.  Overall the colon looks better than last colonoscopy.  Patient next colon recall will be in 2 years.  Continue with Rinvoq.  Patient was advised to have vaccination against shingles.  6/24 Patient here today in good general state.  He is here for his close oncologic follow-up.  Patient on Rinvoq and mesalamine doing well, denies any GI symptoms.  No abdominal pain or rectal bleeding.  Patient will have laboratory to check on kidneys and liver function, CBC, TB Gold, hepatitis B.

## 2025-05-28 ENCOUNTER — OFFICE VISIT (OUTPATIENT)
Dept: URBAN - METROPOLITAN AREA CLINIC 60 | Facility: CLINIC | Age: 45
End: 2025-05-28
Payer: COMMERCIAL

## 2025-05-28 DIAGNOSIS — K51.00 CHRONIC ULCERATIVE COLITIS: ICD-10-CM

## 2025-05-28 DIAGNOSIS — K51.00 ULCERATIVE PANCOLITIS WITHOUT COMPLICATION: ICD-10-CM

## 2025-05-28 DIAGNOSIS — D12.6 BENIGN NEOPLASM OF COLON, UNSPECIFIED: ICD-10-CM

## 2025-05-28 PROCEDURE — 99214 OFFICE O/P EST MOD 30 MIN: CPT | Performed by: NURSE PRACTITIONER

## 2025-05-28 RX ORDER — ROSUVASTATIN CALCIUM 20 MG/1
TABLET, FILM COATED ORAL
Qty: 90 TABLET | Refills: 0 | Status: ACTIVE | COMMUNITY

## 2025-05-28 RX ORDER — CALCIUM CARBONATE/VITAMIN D3 600 MG-10
1 CAPSULE TABLET ORAL ONCE A DAY
Status: ACTIVE | COMMUNITY

## 2025-05-28 RX ORDER — UPADACITINIB 30 MG/1
1 TABLET TABLET, EXTENDED RELEASE ORAL ONCE A DAY
Qty: 90 TABLET | Refills: 5 | Status: ACTIVE | COMMUNITY

## 2025-05-28 NOTE — HPI-TODAY'S VISIT:
5/17 Patient comes from [facility] with diagnosis of UC in 2016 with chronic diarrhea and rectal bleeding, had a colonoscopy with evidence of pancolitis without lesion of the ileum,  Patient on prednisone 10 mg after high dose titration, and taking also Azulfidine 1 gr  TID. Patient will need Labs and will check CRP and ESR, will check LFT. And CBC. Patient is anemic and on treatment with iron. Will need colonoscopy, and will do IBD panel if is needed. Will try to wean him off prednisone and will continue with same treatment for now but will consider Remicade/Humira. Will check hepatitis panel, was checked by TB. Will follow closely. Discussion [Use for free text]. Discussed the need for appropriate follow-up care.  Patient will be placed in our recall system and a letter will be mailed to the patient.   6/17 Patient with UC, continue with multiples small BM with mucus and blood, no changes with the decreasing of steroids doses. Patient anemic with deficit of folate and Iron, probably as a side effect of use of Azulfidine. Labs Increased CPR 4.5 Normal hepatitis panel and liver enzymes.  Plan Will DC Azulfidine.  Patient will start with Apriso. Continue with 2.5 mg of prednisone for now. Start with folic acid 5 mg max dose for now and continue with PO Iron.  Colonoscopy upon clinic status patient may will need treatment with Humira / Remicade.  8/17 Patient is feeling better, BM frequency are decreased, no hematochezia or mucus. Denies abdominal pain. Patient will continue treatment with folate and Iron, and with Lialda and will have colonoscopy. Will do labs for folate and Iron next visit.  9/17 Patient is feeling better, gaining weight, more energy and general state, no BM during the night, 3-4 BM at day with no blood or mucus. Patient will do Lialda maintenance dose of 2.4 g at day. Colonoscopy with evidence of acute on chronic colon mucosa inflammation, friable, vascular, pseudopolyps aspect of the colon mucosa, with normal mucosa of the terminal ileum  Patient will do Lialda maintenance dose of 2.4 g at day. Will do labs in next visit.  1/19 Patient here today in good general state, he said recently has a flare of his UC, because he had no insurance or not medication. He had resumed his treatment this month and is doing better but still more than 6 time in 24 hrs, no blood, or mucus, not abdominal pain. Also, complaint of symptoms of gastritis. Will plan colonoscopy and EGD upon clinical state and labs results. Patient may need treatment with biologic. Plan Patient will increase Lialda to 4.8 mg at day will start PPI. 3/19 Patient here today in good general state, he said has not more diarrhea or rectal bleeding regular bleeding. Stool studies are negative, anemia 11.1. CRP and ESR are normal. Patient will continue with Lialda, we will consider colonoscopy in 5 months. Will start treatment with Iron. 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  Patient said has been doing better this lasts couple of month, he had just 1 event of rectal bleeding a month ago, he said is feeling better has more energy and no anemia.  Patient had a colonoscopy done 2 years ago. Plan: Patient will have laboratories to check renal function, H&H, and we have surveillance colonoscopy. Continue with Lialda.  9/19 Patient here today in good general state, patient denies any GI symptoms, no rectal bleeding, no abdominal pain, no mucus discharge.  Laboratories he is here for his colonoscopy results he was found having 4 mm adenoma polyps into the transverse colon, internal hemorrhoids active vascular, congested, erythematous, friable, pseudopolyps mucosa in the entire examined colon.  Patient with medical history of ulcerative colitis.  Patient hemoglobin improved to 14.7.  Normal platelets, normal WBC, and RBC, normal liver enzymes, normal renal function, and blood sugar.  Levels of B12 and folate are normal. Plan: We will refer him with Dr.Jennifer Wise to have a chrome endoscopy to rule out cancer or pre-cancer lesions.  Patient will continue with Lialda 1.2 gm 2 tablets twice a day. 11/19 Patient here today in good general state he denies any abdominal pain, diarrhea, rectal bleeding.  Patient has an appointment in [place] to be seen by Dr. Odell due to colonoscopy positive for adenoma in the seating of an Ulcerative Colitis, since Dr. Dalia Alicea is not doing this procedure in [place] anymore. Patient will continue treatment with Lialda. 2/20 Patient here today in good general state, he said is a symptomatic, has normal bowel movements, no rectal bleeding, no mucus discharge, no abdominal pain.  Patient did visit the [facility] specialist, he was recommended to start on Remicade, by patient report he's going to start with Remicade on a month.  We will obtain records from [place].  Patient did not have the special colonoscopy done yet, he was advised to have several treatments of Remicade before he had a repeated colonoscopy. 4/20 Patient seems to be in good general state today, patient has medical history of severe ulcerative colitis with adenoma polyps of the colon.  He had no GI complaints, no abdominal pain, diarrhea, rectal bleeding, mucus discharge, fever, cough, sore throat.  He said he did not start with Remicade treatment because of the COVID-19 pandemia, he is afraid that his immune system get affected in the grade that he can acquire the virus.  Patient is taking Lialda at this time, doing diet, and is in self-quarantine at home. Patient will continue in self-isolation for now and will continue with Lialda until start with Remicade. 11/20 Patient here today in good general state, he said is feeling well, he completed his fourth doses of Remicade.  He said still using the Lialda 1.2 g bid.  Because when he stopped Lialda he started having diarrhea again.  But he also said he noticed the dual treatment with Remicade and Lialda work good for him. Plan: Patient will continue with this treatment.  And patient will have laboratories below to check on Remicade serum levels and drug antibody. Patient will need to visit his Dr in [place] in regard of his colonoscopy, since he had 4 doses of Remicade already to decide if he should continue with this treatment based on his pathologic and laboratory results or not. 1/21 Patient here today in good general state, patient laboratories shows evidence of: CBC with normal hemoglobin, WBC, and RBC,  CMP: Liver function, bilirubin, proteins, kidney function, and glucose are normal.  Remicade levels are normal, Remicade antibodies slightly elevated 22. Patient here today in good general state, he has no GI complaints today.  He said have no abdominal pain, diarrhea, rectal bleeding or any other GI symptoms.  Patient did not visit Dr. Odell in [facility] yet for his colonoscopy, he said he is planning to see her next month. Patient will continue with treatment with Lialda and Remicade, we will check antibodies and level of the Remicade in 4 months. 1/22 Patient here today in good general state.  He had no GI complaints.  Denies any diarrhea, rectal discharge, abdominal pain, or rectal bleeding.  Laboratories CBC was normal CMP shows evidence of elevated creatinine and low glomerular filtration rate.  Patient liver enzymes are normal.  Remicade antibodies are elevated more than 100 medication level is 4.0. Plan: We will repeat laboratory to check on liver function.  And also will repeat laboratories for increasing mid back level and [facility].   Patient is a symptomatic but, we may increase the frequency of the Remicade infusions upon patient laboratories to decrease the chance of flares. Patient has medical history of large tubular adenoma polyps 2 years ago we will plan colonoscopy in his next office visit.  2/22 Patient here today in good general state.  He denies any GI symptoms, abdominal pain, diarrhea, rectal bleeding or any sign of ulcerative colitis flare. Laboratories done at [facility] shows evidence of none detectable serum Infliximab less than 1.0 and detectable antibodies to infliximab 5.0. 6/22 Colonoscopy report demonstrate two 12 to 15 mm polyps into the mid descending colon, removed.  A diminutive polyp into the mid-ascending colon, removed.  Pseudopolyps in the entire examined colon.  Inflamed mucosa in the rectum, in the descending colon, at the splenic flexure, in the transverse colon, at the hepatic flexure, in the ascending and in the cecum/ biopsied.  The examined portion of the ileum was normal.  No bleeding internal hemorrhoids. Biopsy results show evidence of terminal ileum biopsy with a small bowel mucosa with mild chronic enteritis.  Right colon biopsy chronic colitis.  Ascending colon polypectomy: Inflammatory pseudopolyp.  Left colon biopsy: Chronic active colitis with isolated cryptitis.  Mid descending colon polypectomy: Chronic active colitis with ulcerations.  Sigmoid colon biopsy: Chronic colitis with distortion, no cryptitis.  Rectum biopsy: Chronic active colitis.  Today patient is in good general state he has no GI complaints he denies any rectal bleeding, rectal discharge, diarrhea, constipation, abdominal pain or any other GI symptoms. Laboratories done on June 14, 2022, demonstrate lipid panel with slightly elevated triglyceride 212, LDL cholesterol 105, non-HDL cholesterol 138.  CMP glomerular filtration rate 80, creatinine 1.15 normal, normal liver enzymes and blood sugar.  CBC normal, TSH normal. 8/22  Patient here today in good general state. He denies any rectal bleeding, abdominal pain, diarrhea, or constipation, bowel movements around 3-4 a day. Some mucus discharge on and off. Patient states he is feeling fairly well. We have a lengthy conversation with him about his colon progressing ulcerative colitis condition demonstrated by her last colonoscopy/ biopsy results, and possible future complications as severe flares, cancer, colon resection.  Patient has been in Remicade and mesalamine through the last 2-year, with no improvement of the colonic mucosa, with findings of tubular adenoma polyps, pseudopolyps, and extensive ulcerative process throughout the colon. Patient laboratories showed adequate levels of Infliximab the day before of the Remicade infusion and 3 weeks after the infusion with no antibodies present, despite these laboratory results and based his colonoscopy/pathology results we think this treatment is not working for him.  So, we think it is better for this patient to stop Remicade infusion and mesalamine and start treatment with Rinvoq 45 mg daily for 8 weeks and upon patient clinical course will decrease to 35 mg daily maintenance dose.  9/22 Patient here today in good general state he is started on Rinvoq 45 mg daily 5 weeks ago.  Patient noted improvement on his bowel movements in general state.  He will continue with 45 mg of Rinvoq for 3 more weeks and will decrease it to 35 mg as maintenance doses. 12/22 Patient is here today in good general state, he denies any rectal bleeding, diarrhea, abdominal pain or any other GI symptoms he is having 1 or 2 bowel movements daily.  He is on 35 mg of Rinvoq daily. 11/23 Patient has medical history of ulcerative colitis.  Patient was started on Rinvoq and also continue on mesalamine, and he was doing good.  He said he's still doing good but, he is now at having green box anymore because he was lacking of medical insurance. Patient denies any rectal bleeding, abdominal pain, nausea, vomited, bowel movements are 2 or 3 a day, with no blood or mucus in it. Patient will resume treatment with Rinvoq 45 mg daily, continue.   12/23 Patient is here today in good general state he has no GI complaints, denying any abdominal pain, diarrhea, rectal discharge, rectal bleeding.  He is on Rinvoq 30 mg daily and mesalamine.Laboratories shows evidence of negative hepatitis B, negative QuantiFERO -TB Gold, slightly elevated A 73, ST ALT.  Normal.  Normal kidney function, and normal CRP.   Surveillance colonoscopy. 2/24Patient here today in good general state he has no GI complaints today denies any abdominal pain, diarrhea, constipation, rectal bleeding, rectal discharge.  Colonoscopy shows evidence of internal hemorrhoids atrophic, erythematous, cervical polyps and vascular/pattern decreases mucosa in the entire examined colon.  Overall the colon looks better than last colonoscopy.  Patient next colon recall will be in 2 years.  Continue with Rinvoq.  Patient was advised to have vaccination against shingles.  6/24 Patient here today in good general state.  He is here for his close oncologic follow-up.  Patient on Rinvoq and mesalamine doing well, denies any GI symptoms.  No abdominal pain or rectal bleeding.  Patient will have laboratory to check on kidneys and liver function, CBC, TB Gold, hepatitis B.  02/24 Patient here today in good general state.  Patient laboratories shows evidence of normal lactoferrin, calprotectin, folate and vitamin B12, CRP, negative for hepatitis B, normal TB Gold plus.  CMP slightly elevated ALT 64 normal AST bilirubin and alkaline phosphatase.  Patient has increased a lot of weight he has done a FibroScan on December 2024 the procedure was negative for liver asteatosis or fibrosis.  He states he has an ultrasound done by her primary doctor that was negative for fatty liver.  Patient will keep good control of lipids, weight, blood sugar.  Will repeat this laboratory in  3 months.  Continue with treatment with Rinvoq  05/25  Patient here today in good general state, he denies any abdominal pain, rectal bleeding, diarrhea, or any other GI symptoms.  Patient on Rinvoq patient has not GI complaint today, he did increase in weight, he is not doing diet, AST is still elevated the rest of the CMP normal.
hearing aid/wheel chair/dentures